# Patient Record
Sex: MALE | Race: WHITE | NOT HISPANIC OR LATINO | Employment: OTHER | URBAN - METROPOLITAN AREA
[De-identification: names, ages, dates, MRNs, and addresses within clinical notes are randomized per-mention and may not be internally consistent; named-entity substitution may affect disease eponyms.]

---

## 2019-04-10 ENCOUNTER — HISTORICAL (OUTPATIENT)
Dept: ADMINISTRATIVE | Facility: HOSPITAL | Age: 49
End: 2019-04-10

## 2019-04-10 LAB
ABS NEUT (OLG): 2.7 X10(3)/MCL (ref 2.1–9.2)
ALBUMIN SERPL-MCNC: 4.2 GM/DL (ref 3.4–5)
ALBUMIN/GLOB SERPL: 1.91 {RATIO} (ref 1.5–2.5)
ALP SERPL-CCNC: 80 UNIT/L (ref 38–126)
ALT SERPL-CCNC: 37 UNIT/L (ref 7–52)
APPEARANCE, UA: CLEAR
AST SERPL-CCNC: 20 UNIT/L (ref 15–37)
BACTERIA #/AREA URNS AUTO: NORMAL /HPF
BILIRUB SERPL-MCNC: 0.8 MG/DL (ref 0.2–1)
BILIRUB UR QL STRIP: NEGATIVE MG/DL
BILIRUBIN DIRECT+TOT PNL SERPL-MCNC: 0.1 MG/DL (ref 0–0.5)
BILIRUBIN DIRECT+TOT PNL SERPL-MCNC: 0.7 MG/DL
BUN SERPL-MCNC: 16 MG/DL (ref 7–18)
CALCIUM SERPL-MCNC: 9.1 MG/DL (ref 8.5–10)
CHLORIDE SERPL-SCNC: 102 MMOL/L (ref 98–107)
CHOLEST SERPL-MCNC: 233 MG/DL (ref 0–200)
CHOLEST/HDLC SERPL: 6.7 {RATIO}
CO2 SERPL-SCNC: 32 MMOL/L (ref 21–32)
COLOR UR: NORMAL
CREAT SERPL-MCNC: 1.04 MG/DL (ref 0.6–1.3)
ERYTHROCYTE [DISTWIDTH] IN BLOOD BY AUTOMATED COUNT: 12.1 % (ref 11.5–17)
EST. AVERAGE GLUCOSE BLD GHB EST-MCNC: 108 MG/DL
GLOBULIN SER-MCNC: 2.2 GM/DL (ref 1.2–3)
GLUCOSE (UA): NEGATIVE MG/DL
GLUCOSE SERPL-MCNC: 90 MG/DL (ref 74–106)
H PYLORI AB SER IA-ACNC: NEGATIVE
HBA1C MFR BLD: 5.4 % (ref 4.4–6.4)
HCT VFR BLD AUTO: 43.2 % (ref 42–52)
HDLC SERPL-MCNC: 35 MG/DL (ref 35–60)
HGB BLD-MCNC: 15.2 GM/DL (ref 14–18)
HGB UR QL STRIP: NEGATIVE UNIT/L
KETONES UR QL STRIP: NEGATIVE MG/DL
LDLC SERPL CALC-MCNC: 104 MG/DL (ref 0–129)
LEUKOCYTE ESTERASE UR QL STRIP: NEGATIVE UNIT/L
LYMPHOCYTES # BLD AUTO: 1.8 X10(3)/MCL (ref 0.6–3.4)
LYMPHOCYTES NFR BLD AUTO: 36.1 % (ref 13–40)
MCH RBC QN AUTO: 31.5 PG (ref 27–31.2)
MCHC RBC AUTO-ENTMCNC: 35 GM/DL (ref 32–36)
MCV RBC AUTO: 90 FL (ref 80–94)
MONOCYTES # BLD AUTO: 0.4 X10(3)/MCL (ref 0.1–1.3)
MONOCYTES NFR BLD AUTO: 9.1 % (ref 0.1–24)
NEUTROPHILS NFR BLD AUTO: 54.8 % (ref 47–80)
NITRITE UR QL STRIP.AUTO: NEGATIVE
PH UR STRIP: 7 [PH]
PLATELET # BLD AUTO: 223 X10(3)/MCL (ref 130–400)
PMV BLD AUTO: 8.4 FL (ref 9.4–12.4)
POTASSIUM SERPL-SCNC: 4.1 MMOL/L (ref 3.5–5.1)
PROT SERPL-MCNC: 6.4 GM/DL (ref 6.4–8.2)
PROT UR QL STRIP: NEGATIVE MG/DL
PSA SERPL-MCNC: 0 NG/ML (ref 0–3.5)
RBC # BLD AUTO: 4.82 X10(6)/MCL (ref 4.7–6.1)
RBC #/AREA URNS HPF: NORMAL /HPF
SODIUM SERPL-SCNC: 139 MMOL/L (ref 136–145)
SP GR UR STRIP: 1.02
SQUAMOUS EPITHELIAL, UA: NORMAL /LPF
TESTOST SERPL-MCNC: 410 NG/DL (ref 300–1060)
TRIGL SERPL-MCNC: 299 MG/DL (ref 30–150)
TSH SERPL-ACNC: 1.41 MIU/ML (ref 0.35–4.94)
UROBILINOGEN UR STRIP-ACNC: 0.2 MG/DL
VLDLC SERPL CALC-MCNC: 59.8 MG/DL
WBC # SPEC AUTO: 4.9 X10(3)/MCL (ref 4.5–11.5)
WBC #/AREA URNS AUTO: NORMAL /[HPF]

## 2019-09-26 ENCOUNTER — HISTORICAL (OUTPATIENT)
Dept: ADMINISTRATIVE | Facility: HOSPITAL | Age: 49
End: 2019-09-26

## 2019-09-26 LAB
ABS NEUT (OLG): 3.5 X10(3)/MCL (ref 2.1–9.2)
ALBUMIN SERPL-MCNC: 4.4 GM/DL (ref 3.4–5)
ALBUMIN/GLOB SERPL: 2.2 {RATIO} (ref 1.5–2.5)
ALP SERPL-CCNC: 91 UNIT/L (ref 38–126)
ALT SERPL-CCNC: 62 UNIT/L (ref 7–52)
APPEARANCE, UA: CLEAR
AST SERPL-CCNC: 40 UNIT/L (ref 15–37)
BACTERIA #/AREA URNS AUTO: NORMAL /HPF
BILIRUB SERPL-MCNC: 0.6 MG/DL (ref 0.2–1)
BILIRUB UR QL STRIP: NEGATIVE MG/DL
BILIRUBIN DIRECT+TOT PNL SERPL-MCNC: 0.1 MG/DL (ref 0–0.5)
BILIRUBIN DIRECT+TOT PNL SERPL-MCNC: 0.5 MG/DL
BUN SERPL-MCNC: 13 MG/DL (ref 7–18)
CALCIUM SERPL-MCNC: 9.1 MG/DL (ref 8.5–10)
CHLORIDE SERPL-SCNC: 99 MMOL/L (ref 98–107)
CHOLEST SERPL-MCNC: 361 MG/DL (ref 0–200)
CHOLEST/HDLC SERPL: 8.4 {RATIO}
CO2 SERPL-SCNC: 33 MMOL/L (ref 21–32)
COLOR UR: YELLOW
CREAT SERPL-MCNC: 1.07 MG/DL (ref 0.6–1.3)
ERYTHROCYTE [DISTWIDTH] IN BLOOD BY AUTOMATED COUNT: 12.3 % (ref 11.5–17)
GLOBULIN SER-MCNC: 2 GM/DL (ref 1.2–3)
GLUCOSE (UA): NEGATIVE MG/DL
GLUCOSE SERPL-MCNC: 108 MG/DL (ref 74–106)
HCT VFR BLD AUTO: 42.9 % (ref 42–52)
HDLC SERPL-MCNC: 43 MG/DL (ref 35–60)
HGB BLD-MCNC: 15 GM/DL (ref 14–18)
HGB UR QL STRIP: NEGATIVE UNIT/L
KETONES UR QL STRIP: NEGATIVE MG/DL
LDLC SERPL CALC-MCNC: 161 MG/DL (ref 0–129)
LEUKOCYTE ESTERASE UR QL STRIP: NEGATIVE UNIT/L
LYMPHOCYTES # BLD AUTO: 1.8 X10(3)/MCL (ref 0.6–3.4)
LYMPHOCYTES NFR BLD AUTO: 30.9 % (ref 13–40)
MCH RBC QN AUTO: 31 PG (ref 27–31.2)
MCHC RBC AUTO-ENTMCNC: 35 GM/DL (ref 32–36)
MCV RBC AUTO: 89 FL (ref 80–94)
MONOCYTES # BLD AUTO: 0.5 X10(3)/MCL (ref 0.1–1.3)
MONOCYTES NFR BLD AUTO: 8.1 % (ref 0.1–24)
NEUTROPHILS NFR BLD AUTO: 61 % (ref 47–80)
NITRITE UR QL STRIP.AUTO: NEGATIVE
PH UR STRIP: 5.5 [PH]
PLATELET # BLD AUTO: 257 X10(3)/MCL (ref 130–400)
PMV BLD AUTO: 7.8 FL (ref 9.4–12.4)
POTASSIUM SERPL-SCNC: 4.1 MMOL/L (ref 3.5–5.1)
PROT SERPL-MCNC: 6.4 GM/DL (ref 6.4–8.2)
PROT UR QL STRIP: NEGATIVE MG/DL
RBC # BLD AUTO: 4.84 X10(6)/MCL (ref 4.7–6.1)
RBC #/AREA URNS HPF: NORMAL /HPF
SODIUM SERPL-SCNC: 138 MMOL/L (ref 136–145)
SP GR UR STRIP: 1.02
SQUAMOUS EPITHELIAL, UA: NORMAL /LPF
TRIGL SERPL-MCNC: 432 MG/DL (ref 30–150)
UROBILINOGEN UR STRIP-ACNC: 0.2 MG/DL
VLDLC SERPL CALC-MCNC: 86.4 MG/DL
WBC # SPEC AUTO: 5.8 X10(3)/MCL (ref 4.5–11.5)
WBC #/AREA URNS AUTO: NORMAL /[HPF]

## 2019-09-27 LAB
EST. AVERAGE GLUCOSE BLD GHB EST-MCNC: 111 MG/DL
HBA1C MFR BLD: 5.5 % (ref 4.4–6.4)

## 2020-09-28 ENCOUNTER — HISTORICAL (OUTPATIENT)
Dept: ADMINISTRATIVE | Facility: HOSPITAL | Age: 50
End: 2020-09-28

## 2020-09-28 LAB
ABS NEUT (OLG): 4.9 X10(3)/MCL (ref 2.1–9.2)
ALBUMIN SERPL-MCNC: 4.4 GM/DL (ref 3.4–5)
ALBUMIN/GLOB SERPL: 1.91 {RATIO} (ref 1.5–2.5)
ALP SERPL-CCNC: 90 UNIT/L (ref 38–126)
ALT SERPL-CCNC: 19 UNIT/L (ref 7–52)
AST SERPL-CCNC: 16 UNIT/L (ref 15–37)
BILIRUB SERPL-MCNC: 0.6 MG/DL (ref 0.2–1)
BILIRUBIN DIRECT+TOT PNL SERPL-MCNC: 0.1 MG/DL (ref 0–0.5)
BILIRUBIN DIRECT+TOT PNL SERPL-MCNC: 0.5 MG/DL
BUN SERPL-MCNC: 14 MG/DL (ref 7–18)
CALCIUM SERPL-MCNC: 9.7 MG/DL (ref 8.5–10.1)
CHLORIDE SERPL-SCNC: 103 MMOL/L (ref 98–107)
CHOLEST SERPL-MCNC: 181 MG/DL (ref 0–200)
CHOLEST/HDLC SERPL: 5.2 {RATIO}
CO2 SERPL-SCNC: 30 MMOL/L (ref 21–32)
CREAT SERPL-MCNC: 1.06 MG/DL (ref 0.6–1.3)
ERYTHROCYTE [DISTWIDTH] IN BLOOD BY AUTOMATED COUNT: 13.3 % (ref 11.5–17)
GLOBULIN SER-MCNC: 2.3 GM/DL (ref 1.2–3)
GLUCOSE SERPL-MCNC: 100 MG/DL (ref 74–106)
HCT VFR BLD AUTO: 44.7 % (ref 42–52)
HDLC SERPL-MCNC: 35 MG/DL (ref 35–60)
HGB BLD-MCNC: 15 GM/DL (ref 14–18)
LDLC SERPL CALC-MCNC: 98 MG/DL (ref 0–129)
LYMPHOCYTES # BLD AUTO: 1.5 X10(3)/MCL (ref 0.6–3.4)
LYMPHOCYTES NFR BLD AUTO: 21.2 % (ref 13–40)
MCH RBC QN AUTO: 30.7 PG (ref 27–31.2)
MCHC RBC AUTO-ENTMCNC: 34 GM/DL (ref 32–36)
MCV RBC AUTO: 91 FL (ref 80–94)
MONOCYTES # BLD AUTO: 0.5 X10(3)/MCL (ref 0.1–1.3)
MONOCYTES NFR BLD AUTO: 6.9 % (ref 0.1–24)
NEUTROPHILS NFR BLD AUTO: 71.9 % (ref 47–80)
PLATELET # BLD AUTO: 265 X10(3)/MCL (ref 130–400)
PMV BLD AUTO: 8.2 FL (ref 9.4–12.4)
POTASSIUM SERPL-SCNC: 4 MMOL/L (ref 3.5–5.1)
PROT SERPL-MCNC: 6.7 GM/DL (ref 6.4–8.2)
RBC # BLD AUTO: 4.89 X10(6)/MCL (ref 4.7–6.1)
SODIUM SERPL-SCNC: 142 MMOL/L (ref 136–145)
TRIGL SERPL-MCNC: 211 MG/DL (ref 30–150)
VLDLC SERPL CALC-MCNC: 42.2 MG/DL
WBC # SPEC AUTO: 6.9 X10(3)/MCL (ref 4.5–11.5)

## 2021-07-20 ENCOUNTER — HISTORICAL (OUTPATIENT)
Dept: ADMINISTRATIVE | Facility: HOSPITAL | Age: 51
End: 2021-07-20

## 2021-09-01 ENCOUNTER — HISTORICAL (OUTPATIENT)
Dept: ADMINISTRATIVE | Facility: HOSPITAL | Age: 51
End: 2021-09-01

## 2021-10-07 ENCOUNTER — HISTORICAL (OUTPATIENT)
Dept: ADMINISTRATIVE | Facility: HOSPITAL | Age: 51
End: 2021-10-07

## 2021-10-07 LAB
ABS NEUT (OLG): 4.7 X10(3)/MCL (ref 2.1–9.2)
ALBUMIN SERPL-MCNC: 4.5 GM/DL (ref 3.4–5)
ALBUMIN/GLOB SERPL: 1.88 {RATIO} (ref 1.5–2.5)
ALP SERPL-CCNC: 131 UNIT/L (ref 38–126)
ALT SERPL-CCNC: 48 UNIT/L (ref 7–52)
APPEARANCE, UA: CLEAR
APTT PPP: 36.3 SECOND(S) (ref 23.2–33.7)
AST SERPL-CCNC: 35 UNIT/L (ref 15–37)
BACTERIA #/AREA URNS AUTO: ABNORMAL /HPF
BILIRUB SERPL-MCNC: 0.4 MG/DL (ref 0.2–1)
BILIRUB UR QL STRIP: NEGATIVE MG/DL
BILIRUBIN DIRECT+TOT PNL SERPL-MCNC: 0.1 MG/DL (ref 0–0.5)
BILIRUBIN DIRECT+TOT PNL SERPL-MCNC: 0.3 MG/DL
BUN SERPL-MCNC: 16 MG/DL (ref 7–18)
CALCIUM SERPL-MCNC: 9.7 MG/DL (ref 8.5–10.1)
CHLORIDE SERPL-SCNC: 101 MMOL/L (ref 98–107)
CHOLEST SERPL-MCNC: 250 MG/DL (ref 0–200)
CHOLEST/HDLC SERPL: 8.3 {RATIO}
CO2 SERPL-SCNC: 32 MMOL/L (ref 21–32)
COLOR UR: YELLOW
CREAT SERPL-MCNC: 0.79 MG/DL (ref 0.6–1.3)
ERYTHROCYTE [DISTWIDTH] IN BLOOD BY AUTOMATED COUNT: 13.1 % (ref 11.5–17)
EST. AVERAGE GLUCOSE BLD GHB EST-MCNC: 111 MG/DL
GLOBULIN SER-MCNC: 2.4 GM/DL (ref 1.2–3)
GLUCOSE (UA): NEGATIVE MG/DL
GLUCOSE SERPL-MCNC: 82 MG/DL (ref 74–106)
HBA1C MFR BLD: 5.5 % (ref 4.4–6.4)
HCT VFR BLD AUTO: 45.9 % (ref 42–52)
HDLC SERPL-MCNC: 30 MG/DL (ref 35–60)
HGB BLD-MCNC: 15.2 GM/DL (ref 14–18)
HGB UR QL STRIP: NEGATIVE UNIT/L
INR PPP: 0.9 (ref 0–1.3)
KETONES UR QL STRIP: NEGATIVE MG/DL
LDLC SERPL CALC-MCNC: 101 MG/DL (ref 0–129)
LEUKOCYTE ESTERASE UR QL STRIP: NEGATIVE UNIT/L
LYMPHOCYTES # BLD AUTO: 1.3 X10(3)/MCL (ref 0.6–3.4)
LYMPHOCYTES NFR BLD AUTO: 19.6 % (ref 13–40)
MCH RBC QN AUTO: 30.9 PG (ref 27–31.2)
MCHC RBC AUTO-ENTMCNC: 33 GM/DL (ref 32–36)
MCV RBC AUTO: 93 FL (ref 80–94)
MONOCYTES # BLD AUTO: 0.6 X10(3)/MCL (ref 0.1–1.3)
MONOCYTES NFR BLD AUTO: 8.6 % (ref 0.1–24)
NEUTROPHILS NFR BLD AUTO: 71.8 % (ref 47–80)
NITRITE UR QL STRIP.AUTO: NEGATIVE
PH UR STRIP: 6 [PH]
PLATELET # BLD AUTO: 249 X10(3)/MCL (ref 130–400)
PMV BLD AUTO: 8.5 FL (ref 9.4–12.4)
POTASSIUM SERPL-SCNC: 4.2 MMOL/L (ref 3.5–5.1)
PROT SERPL-MCNC: 6.9 GM/DL (ref 6.4–8.2)
PROT UR QL STRIP: NEGATIVE MG/DL
PROTHROMBIN TIME: 11.8 SECOND(S) (ref 12.5–14.5)
RBC # BLD AUTO: 4.92 X10(6)/MCL (ref 4.7–6.1)
RBC #/AREA URNS HPF: ABNORMAL /HPF
SODIUM SERPL-SCNC: 140 MMOL/L (ref 136–145)
SP GR UR STRIP: >1.03
SQUAMOUS EPITHELIAL, UA: ABNORMAL /LPF
TRIGL SERPL-MCNC: 499 MG/DL (ref 30–150)
UROBILINOGEN UR STRIP-ACNC: 0.2 MG/DL
VLDLC SERPL CALC-MCNC: 99.8 MG/DL
WBC # SPEC AUTO: 6.6 X10(3)/MCL (ref 4.5–11.5)
WBC #/AREA URNS AUTO: ABNORMAL /[HPF]

## 2021-10-08 ENCOUNTER — HISTORICAL (OUTPATIENT)
Dept: ADMINISTRATIVE | Facility: HOSPITAL | Age: 51
End: 2021-10-08

## 2021-10-08 ENCOUNTER — HISTORICAL (OUTPATIENT)
Dept: LAB | Facility: HOSPITAL | Age: 51
End: 2021-10-08

## 2021-10-08 LAB — MRSA SCREEN BY PCR: NEGATIVE

## 2021-10-22 ENCOUNTER — HISTORICAL (OUTPATIENT)
Dept: ADMINISTRATIVE | Facility: HOSPITAL | Age: 51
End: 2021-10-22

## 2021-10-25 ENCOUNTER — HISTORICAL (OUTPATIENT)
Dept: LAB | Facility: HOSPITAL | Age: 51
End: 2021-10-25

## 2021-10-25 LAB — SARS-COV-2 AG RESP QL IA.RAPID: NEGATIVE

## 2021-11-24 ENCOUNTER — HISTORICAL (OUTPATIENT)
Dept: ADMINISTRATIVE | Facility: HOSPITAL | Age: 51
End: 2021-11-24

## 2021-12-06 ENCOUNTER — HISTORICAL (OUTPATIENT)
Dept: ADMINISTRATIVE | Facility: HOSPITAL | Age: 51
End: 2021-12-06

## 2021-12-06 LAB
ABS NEUT (OLG): 5.47 X10(3)/MCL (ref 2.1–9.2)
ALBUMIN SERPL-MCNC: 3.9 GM/DL (ref 3.5–5)
ALBUMIN/GLOB SERPL: 1.3 RATIO (ref 1.1–2)
ALP SERPL-CCNC: 146 UNIT/L (ref 40–150)
ALT SERPL-CCNC: 43 UNIT/L (ref 0–55)
AST SERPL-CCNC: 22 UNIT/L (ref 5–34)
BASOPHILS # BLD AUTO: 0.06 X10(3)/MCL (ref 0–0.2)
BASOPHILS NFR BLD AUTO: 0.8 % (ref 0–0.9)
BILIRUB SERPL-MCNC: 0.4 MG/DL (ref 0.2–1.2)
BILIRUBIN DIRECT+TOT PNL SERPL-MCNC: 0.1 MG/DL (ref 0–0.5)
BILIRUBIN DIRECT+TOT PNL SERPL-MCNC: 0.3 MG/DL (ref 0–0.8)
BUN SERPL-MCNC: 11.3 MG/DL (ref 8.4–25.7)
CALCIUM SERPL-MCNC: 9.8 MG/DL (ref 8.4–10.2)
CHLORIDE SERPL-SCNC: 103 MMOL/L (ref 98–107)
CO2 SERPL-SCNC: 32 MMOL/L (ref 22–29)
CREAT SERPL-MCNC: 0.83 MG/DL (ref 0.72–1.25)
EOSINOPHIL # BLD AUTO: 0.29 X10(3)/MCL (ref 0–0.9)
EOSINOPHIL NFR BLD AUTO: 3.7 % (ref 0–6.5)
ERYTHROCYTE [DISTWIDTH] IN BLOOD BY AUTOMATED COUNT: 13.6 % (ref 11.5–17)
GLOBULIN SER-MCNC: 3.1 GM/DL (ref 2.4–3.5)
GLUCOSE SERPL-MCNC: 70 MG/DL (ref 74–100)
HCT VFR BLD AUTO: 43.3 % (ref 42–52)
HGB BLD-MCNC: 14.4 GM/DL (ref 14–18)
IMM GRANULOCYTES # BLD AUTO: 0.04 10*3/UL (ref 0–0.02)
IMM GRANULOCYTES NFR BLD AUTO: 0.5 % (ref 0–0.43)
LYMPHOCYTES # BLD AUTO: 1.53 X10(3)/MCL (ref 0.6–4.6)
LYMPHOCYTES NFR BLD AUTO: 19.4 % (ref 16.2–38.3)
MCH RBC QN AUTO: 31.4 PG (ref 27–31)
MCHC RBC AUTO-ENTMCNC: 33.3 GM/DL (ref 33–36)
MCV RBC AUTO: 94.5 FL (ref 80–94)
MONOCYTES # BLD AUTO: 0.51 X10(3)/MCL (ref 0.1–1.3)
MONOCYTES NFR BLD AUTO: 6.5 % (ref 4.7–11.3)
MRSA SCREEN BY PCR: NEGATIVE
NEUTROPHILS # BLD AUTO: 5.47 X10(3)/MCL (ref 2.1–9.2)
NEUTROPHILS NFR BLD AUTO: 69.1 % (ref 49.1–73.4)
NRBC BLD AUTO-RTO: 0 % (ref 0–0.2)
PLATELET # BLD AUTO: 280 X10(3)/MCL (ref 130–400)
PMV BLD AUTO: 8.7 FL (ref 7.4–10.4)
POTASSIUM SERPL-SCNC: 3.9 MMOL/L (ref 3.5–5.1)
PROT SERPL-MCNC: 7 GM/DL (ref 6.4–8.3)
RBC # BLD AUTO: 4.58 X10(6)/MCL (ref 4.7–6.1)
SODIUM SERPL-SCNC: 142 MMOL/L (ref 136–145)
WBC # SPEC AUTO: 7.9 X10(3)/MCL (ref 4.5–11.5)

## 2021-12-15 ENCOUNTER — HISTORICAL (OUTPATIENT)
Dept: LAB | Facility: HOSPITAL | Age: 51
End: 2021-12-15

## 2021-12-15 LAB — SARS-COV-2 AG RESP QL IA.RAPID: NEGATIVE

## 2022-01-12 ENCOUNTER — HISTORICAL (OUTPATIENT)
Dept: ADMINISTRATIVE | Facility: HOSPITAL | Age: 52
End: 2022-01-12

## 2022-01-19 ENCOUNTER — HISTORICAL (OUTPATIENT)
Dept: ADMINISTRATIVE | Facility: HOSPITAL | Age: 52
End: 2022-01-19

## 2022-01-19 LAB
APPEARANCE, UA: CLEAR
BACTERIA #/AREA URNS AUTO: ABNORMAL /HPF
BILIRUB UR QL STRIP: NEGATIVE MG/DL
COLOR UR: YELLOW
GLUCOSE (UA): NEGATIVE MG/DL
HGB UR QL STRIP: NEGATIVE UNIT/L
KETONES UR QL STRIP: ABNORMAL MG/DL
LEUKOCYTE ESTERASE UR QL STRIP: NEGATIVE UNIT/L
NITRITE UR QL STRIP.AUTO: NEGATIVE
PH UR STRIP: 5 [PH]
PROT UR QL STRIP: NEGATIVE MG/DL
RBC #/AREA URNS HPF: ABNORMAL /HPF
SP GR UR STRIP: >1.03
SQUAMOUS EPITHELIAL, UA: ABNORMAL /LPF
UROBILINOGEN UR STRIP-ACNC: 0.2 MG/DL
WBC #/AREA URNS AUTO: ABNORMAL /[HPF]

## 2022-01-21 LAB — FINAL CULTURE: NO GROWTH

## 2022-04-11 ENCOUNTER — HISTORICAL (OUTPATIENT)
Dept: ADMINISTRATIVE | Facility: HOSPITAL | Age: 52
End: 2022-04-11

## 2022-04-24 VITALS
DIASTOLIC BLOOD PRESSURE: 68 MMHG | WEIGHT: 181 LBS | SYSTOLIC BLOOD PRESSURE: 120 MMHG | HEIGHT: 70 IN | OXYGEN SATURATION: 99 % | BODY MASS INDEX: 25.91 KG/M2

## 2022-05-04 NOTE — HISTORICAL [facility]
This is a historical note converted from [place]. Formatting and pictures may have been removed.  Please reference [place] for original formatting and attached multimedia. Chief Complaint  Pre op left hip 10/26/21  History of Present Illness  51-year-old male presents office today for preoperative?exam for robotic assisted left total hip arthroplasty on October 26  No new complaints or concerns today  The patient?has?avascular necrosis of both femoral heads with advanced femoral head collapse.? He has pain?with weightbearing, range of motion?and activity.? This has affected his quality life and decrease his activities of daily living.  Review of Systems  Systemic: No fever, no chills, and no recent weight change.  Head: No headache - frequent.  Eyes: No vision problems.  Otolarnygeal: No hearing loss, no earache, no epistaxis, no hoarseness, and no tooth pain. Gums normal.  Cardiovascular: No chest pain or discomfort and no palpitations.  Pulmonary: No pulmonary symptoms - no dyspnea, no shortness of breath  Gastrointestinal: Appetite not decreased. No dysphagia and no constant eructation. No nausea, no vomiting, no abdominal pain, no hematochezia.  Genitourinary: No genitourinary symptoms - No urinary hesitancy. No urinary loss of control - no burning sensation during urination.  Musculoskeletal: No calf muscle cramps and no localized soft tissue swelling  Neurological: No fainting and no convulsions.  Psychological: no depression.  Skin: No rash.  Physical Exam  Vitals & Measurements  T:?97.0? ?F (Oral)? HR:?90(Peripheral)? BP:?152/91? WT:?79.400?kg? BMI:?25.34?  General exam:  Well-groomed, well-nourished, no acute distress  Alert and oriented ?3  HEENT: PERRLA, normocephalic, atraumatic  Cardiovascular: S1 and S2 heard, no murmurs  Pulmonary: Lungs clear to auscultation bilaterally  Gastrointestinal: Positive bowel sounds, soft, nontender  ?   Left hip exam  No signs of edema, erythema, or induration. No muscle  atrophy seen. Tenderness to palpation over the greater trochanteric region. No tenderness over the sacroiliac joint.  Pain was elicited by active internal rotation with the hip extended. Pain was elicited by active external rotation with the hip extended. Pain was elicited by active internal rotation with the hip flexed. Pain was elicited by active external rotation with the hip flexed. Pain was elicited by active motion. Pain was elicited by passive motion. Hip was not dislocated or subluxed. No instability or laxity seen.  Passive hip abduction?20 degrees  Passive hip flexion 80 degrees  Passive internal rotation 0 degrees  Passive external rotation 5 degrees  Neurological:  Motor (Motor Strength): No weakness of the left hip was observed.  Gait And Stance: Abnormal. An antalgic gait was observed. limping was noted on the left.  ?   Tests  Imaging:  Left hip x-ray with two or more views of the AP and lateral aspects were reviewed  Impressions  Advanced?femoral head collapse?  ?   2 views of the lumbar spine were taken in the office today for?presurgical planning and determination of sacral slope. ?No acute fractures  Assessment/Plan  1.?Avascular necrosis of left femoral head?M87.052  ?Robotic assisted left total hip arthroplasty  We will use Ecotrin aspirin postoperatively for DVT prophylaxis  The risks, benefits, and alternatives to surgery were discussed with the patient and family and they wish to proceed with the operation.  ?  Ordered:  CBC w/ Auto Diff, Routine collect, 10/07/21 13:31:00 CDT, Blood, Order for future visit, Stop date 10/07/21 13:31:00 CDT, Lab Collect, ORTHO-If Hg<11, refer to hematology, Avascular necrosis of left femoral head  Impaired gait and mobility  Pre-op exam, 10/07/21 13:3...  Clinic Follow-up PRN, 10/08/21 9:22:00 CDT, Future Order, [facility]  Comprehensive Metabolic Panel, Routine collect, 10/07/21 13:31:00 CDT, Blood, Order for future visit, Stop date 10/07/21 13:31:00  CDT, Lab Collect, ORTHO-If albumin <3.5, refer to internal medicine, Avascular necrosis of left femoral head  Impaired gait and mobility  Pre-op exam,...  Hemoglobin A1c, Routine collect, 10/07/21 13:31:00 CDT, Blood, Order for future visit, Stop date 10/07/21 13:31:00 CDT, Lab Collect, Avascular necrosis of left femoral head  Impaired gait and mobility  Pre-op exam, 10/07/21 13:31:00 CDT  MRSA Pcr, Routine collect, Nasopharyngeal Swab, Order for future visit, 10/07/21 13:31:00 CDT, Stop date 10/07/21 13:31:00 CDT, Nurse collect, Avascular necrosis of left femoral head  Impaired gait and mobility  Pre-op exam, 10/07/21 13:31:00 CDT  Office/Outpatient Visit Level 3 Established [id number] PC, Avascular necrosis of left femoral head  Impaired gait and mobility  Pre[facility] exam, [facility], 10/08/21 9:22:00 CDT  XR Chest 2 Views, Routine, 10/07/21 13:31:00 CDT, Other (please specify), None, Ambulatory, Rad Type, Order for future visit, Avascular necrosis of left femoral head  Impaired gait and mobility  Pre-op exam, Not Scheduled, 10/07/21 13:31:00 CDT  ?  2.?Impaired gait and mobility?R26.89  Ordered:  CBC w/ Auto Diff, Routine collect, 10/07/21 13:31:00 CDT, Blood, Order for future visit, Stop date 10/07/21 13:31:00 CDT, Lab Collect, ORTHO-If Hg<11, refer to hematology, Avascular necrosis of left femoral head  Impaired gait and mobility  Pre-op exam, 10/07/21 13:3...  Clinic Follow-up PRN, 10/08/21 9:22:00 CDT, Future Order, [facility]  Comprehensive Metabolic Panel, Routine collect, 10/07/21 13:31:00 CDT, Blood, Order for future visit, Stop date 10/07/21 13:31:00 CDT, Lab Collect, ORTHO-If albumin <3.5, refer to internal medicine, Avascular necrosis of left femoral head  Impaired gait and mobility  Pre-op exam,...  Hemoglobin A1c, Routine collect, 10/07/21 13:31:00 CDT, Blood, Order for future visit, Stop date 10/07/21 13:31:00 CDT, Lab Collect, Avascular necrosis of left femoral head  Impaired  gait and mobility  Pre-op exam, 10/07/21 13:31:00 CDT  MRSA Pcr, Routine collect, Nasopharyngeal Swab, Order for future visit, 10/07/21 13:31:00 CDT, Stop date 10/07/21 13:31:00 CDT, Nurse collect, Avascular necrosis of left femoral head  Impaired gait and mobility  Pre-op exam, 10/07/21 13:31:00 CDT  Office/Outpatient Visit Level 3 Established [id number] PC, Avascular necrosis of left femoral head  Impaired gait and mobility  [facility] exam, [facility], 10/08/21 9:22:00 CDT  XR Chest 2 Views, Routine, 10/07/21 13:31:00 CDT, Other (please specify), None, Ambulatory, Rad Type, Order for future visit, Avascular necrosis of left femoral head  Impaired gait and mobility  Pre-op exam, Not Scheduled, 10/07/21 13:31:00 CDT  ?  3.?Pre-op exam?Z01.818  Ordered:  CBC w/ Auto Diff, Routine collect, 10/07/21 13:31:00 CDT, Blood, Order for future visit, Stop date 10/07/21 13:31:00 CDT, Lab Collect, ORTHO-If Hg<11, refer to hematology, Avascular necrosis of left femoral head  Impaired gait and mobility  Pre-op exam, 10/07/21 13:3...  Clinic Follow-up PRN, 10/08/21 9:22:00 CDT, Future Order, [facility]  Comprehensive Metabolic Panel, Routine collect, 10/07/21 13:31:00 CDT, Blood, Order for future visit, Stop date 10/07/21 13:31:00 CDT, Lab Collect, ORTHO-If albumin <3.5, refer to internal medicine, Avascular necrosis of left femoral head  Impaired gait and mobility  Pre-op exam,...  Hemoglobin A1c, Routine collect, 10/07/21 13:31:00 CDT, Blood, Order for future visit, Stop date 10/07/21 13:31:00 CDT, Lab Collect, Avascular necrosis of left femoral head  Impaired gait and mobility  Pre-op exam, 10/07/21 13:31:00 CDT  MRSA Pcr, Routine collect, Nasopharyngeal Swab, Order for future visit, 10/07/21 13:31:00 CDT, Stop date 10/07/21 13:31:00 CDT, Nurse collect, Avascular necrosis of left femoral head  Impaired gait and mobility  Pre-op exam, 10/07/21 13:31:00 CDT  Office/Outpatient Visit Level 3 Established [id number]  PC, Avascular necrosis of left femoral head  Impaired gait and mobility  Pre-op exam, [facility], 10/08/21 9:22:00 CDT  XR Chest 2 Views, Routine, 10/07/21 13:31:00 CDT, Other (please specify), None, Ambulatory, Rad Type, Order for future visit, Avascular necrosis of left femoral head  Impaired gait and mobility  Pre-op exam, Not Scheduled, 10/07/21 13:31:00 CDT  ?   Problem List/Past Medical History  Ongoing  Anxiety  Avascular necrosis of left femoral head  Avascular necrosis of right femoral head  Chronic back pain  Fatigue  Gastroesophageal reflux disease  Hx of migraine headaches  Hyperlipidaemia  Impaired gait and mobility  Influenza vaccine refused  Pre-op exam  Wellness examination  Historical  No qualifying data  Procedure/Surgical History  Esophagogastroduodenoscopy (07/05/2017)  Esophagogastroduodenoscopy (12/09/2016)  Colonoscopy with Polypectomy (07/30/2013)  cardiac ablation x 2  knee repair x2  L5 S1  prostate ca   Medications  Inpatient  No active inpatient medications  Home  acetaminophen-hydrocodone 325 mg-5 mg oral tablet, 1 tab(s), Oral, q6hr  clonazePAM 1 mg oral tablet, See Instructions, 5 refills  DULoxetine 60 mg oral delayed release capsule, 60 mg= 1 cap(s), Oral, Daily  naproxen 500 mg oral tablet, 500 mg= 1 tab(s), Oral, BID  OXYBUTYNIN 5 MG TABLET, 5 mg= 1 tab(s), Oral, qPM  pregabalin 150 mg oral capsule, 150 mg= 1 cap(s), Oral, TID  tadalafil 20 mg oral tablet, 20 mg= 1 tab(s), Oral, Daily  tiZANidine 4 mg oral tablet, 4 mg= 1 tab(s), Oral, BID  Allergies  Adhesive Bandage?(Rash)  No Known Medication Allergies  Social History  Abuse/Neglect  No, No, Yes, 10/08/2021  Alcohol  Current, Liquor, 1-2 times per month, 09/01/2021  Employment/School  Employed, Work/School description: SELF EMPLOYED SALES., 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  Diabetes mellitus type 1.: Father.  Diabetes mellitus type 2: Negative: Mother and Father.  Heart disease.....: Mother.  Immunizations  Vaccine Date Status   tetanus/diphtheria/pertussis, acel(Tdap) 12/2011 Recorded       Chest x-ray is normal.

## 2022-05-04 NOTE — HISTORICAL [facility]
This is a historical note converted from Remington. Formatting and pictures may have been removed.  Please reference Remington for original formatting and attached multimedia. Chief Complaint  Annual wellness physical- Non-Fasting  History of Present Illness  Pt presents for Wellness exam.  He has been having a lot of discomfort secondary to his avascular necrosis.  He is not sleeping well secondary to his pain.  He reports h/o hypoglycemia. There is a strong family of diabetes mellitus.  Appetite is good.  He works at his [employer] shop.  He quit smoking 2012.  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 He currently sees Dr Flores; last office visit 9/04/2020.  He may have a rare drink.  He has 1/2 cup of coffee some days; he has 1-2 Dr Peppers/day.  He is?.  Colonoscopy 2013: Dr Rosenberg, was due for follow-up in 5 years.  EGD 2017: Dr Chun.  Urologist: Dr Richard.  Pain management: Dr Shelia Vincent.  Ortho: Dr Gonzalez; bilateral avascular necrosis, will be scheduled for left hip arthroplasty 10/26/2021  Review of Systems  Constitutional:??no?weight gain,??no?weight loss,??no?fatigue, ?no?fever, ?no?chills, ?no?weakness, ?no?trouble sleeping  Eyes: ?no?vision loss/changes,??+?glasses,?readers, ?no?pain,??no?redness,??no?blurry or double vision,??no?flashing lights,??no?glaucoma,??no?cataracts  Last eye exam:? about?2 years ago  Neck: ?no?lymphadenopathy,??no?thyroid abnormalities,??no?bruits,??no?stiffness  Ears:??no?decreased hearing,??no?tinnitus,??no?earache,??no?drainage?  Nose:??no?congestion,??no?rhinorrhea,??no?epistaxis,??no?sinus pressure  Throat/Oral:??no?sore throat,??no?hoarseness, ?no?dental caries,??no?gum bleeding,??no?oral lesions  Cardiovascular:??no?chest pain, palpitations, or tightness,??no?dyspnea with exertion,??no?orthopnea,??no?paroxysmal nocturnal dyspnea, h/o WPW, status post RFA x 2, +  hypercholesteremia  Respiratory:??no?cough,??no?sputum,??no?hemoptysis,??no?dyspnea,??no?wheezing,??no?pleuritic chest pain?  Gastrointestinal:??no?abdominal pain,??no?nausea,??no?vomiting,??+?heartburn/GERD, ?no?dysphagia or odynophagia,??no?diarrhea,??no?constipation,??no?melena,??no?hematochezia,?no?jaundice  Urinary:??no?frequency,??no?urgency,??no?burning or pain,?no?hematuria,??no?incontinence,??no?hesitancy,??no?incomplete voiding,??no?flank pain,??no?nocturia, h/o prostate cancer, s/p prostatectomy  Musculoskeletal:?no?myalgias,??+?arthralgias: bilateral?avascular necrosis, ?tendonitis left elbow, ?no?neck pain,??+?back pain,?chronic, has stimulator, ?no?swelling of extremities  Skin:?no?rashes,??no?sores,??no?non-healing wounds  Neurologic:??+?headaches, h/o migraines, no?dizziness/lightheadedness,??no?tremors,??no?paresthesias,??no?seizures,??no?muscle weakness  Psychiatric:??no?depression/sadness,??no?anhedonia,??no?irritability,??no?suicidal ideations,??+?anxiety,??no?panic attacks  Endocrine:??no?hot or cold intolerance,??no?sweating,??no?polyuria,??no?polydipsia,??no?polyphagia  Hematologic:??no?bruising,??no?bleeding disorders?  Physical Exam  Vitals & Measurements  T:?36.8? ?C (Oral)? HR:?93(Peripheral)? BP:?150/70? SpO2:?98%?  HT:?177.00?cm? WT:?79.400?kg? BMI:?25.34?  ?  GENERAL: The patient is a well-developed, well-nourished white male in no?apparent distress. He is alert and oriented x 4.  HEENT: Head is normocephalic and atraumatic. Extraocular muscles are intact. Pupils are equal, round, and reactive to light and accommodation. Nares appeared normal. Mouth is well hydrated and without lesions. Mucous membranes are moist. Posterior pharynx clear of any exudate or lesions.  NECK: Supple. No carotid bruits.? No lymphadenopathy or thyromegaly.  LUNGS: Clear to auscultation.  HEART: Regular rate and rhythm without murmur, gallops or rubs.  ABDOMEN: Soft, nontender, and nondistended.? Positive bowel  sounds.? No hepatosplenomegaly was noted.  EXTREMITIES: Without any cyanosis, clubbing, rash, lesions or edema.  NEUROLOGIC: Cranial nerves II through XII are grossly intact.? No motor or sensory deficits.? Cerebellar function intact.  SKIN: No ulceration or induration present.  :? deferred.  ?  Assessment/Plan  1.?Wellness examination?Z00.00  Patient presents for wellness examination.  Patient was recently discovered to have bilateral vascular necrosis;?he will have a left total hip?arthroplasty on 10/26/2021 for Dr. Gonzalez.  He has changed pain management doctors; he is now seeing Dr. Shelia Vincent.  Patient is overdue for a follow-up colonoscopy; however,?he will not be able to do this till?his hip situation is resolved.  Patient declines a flu vaccine this time.  Patient will need a Shingrix vaccine at some point.  His EKG today reveals normal sinus rhythm and is within normal limits. ?He has not seen Dr. Flores in a year.  Chest x-ray is pending.  Labs pending.  Ordered:  Clinic Follow-Up Wellness, *Est. 10/07/22 3:00:00 CDT, Order for future visit, Wellness examination, HLink AFP  Comprehensive Metabolic Panel, Routine collect, 10/07/21 11:01:00 CDT, Blood, Stop date 10/07/21 11:01:00 CDT, Lab Collect, Wellness examination  Pre-op evaluation  Hyperlipidaemia, 10/07/21 11:01:00 CDT  Lipid Panel, Routine collect, 10/07/21 11:01:00 CDT, Blood, Stop date 10/07/21 11:01:00 CDT, Lab Collect, Wellness examination  Hyperlipidaemia, 10/07/21 11:01:00 CDT  Preventative Health Care Est 40-64 years [id number] PC, Wellness examination  Pre-op evaluation  Hyperlipidaemia  Gastroesophageal reflux disease  Hx of migraine headaches  Anxiety  Chronic back pain  Avascular necrosis of left femoral head  Avascular necrosis of right femoral head, HLINK AMB - AFP...  ?  2.?Pre-op evaluation?Z01.818  His EKG today reveals normal sinus rhythm is within normal limits.  Chest x-ray pending.  Labs pending.  Ordered:  Comprehensive  Metabolic Panel, Routine collect, 10/07/21 11:01:00 CDT, Blood, Stop date 10/07/21 11:01:00 CDT, Lab Collect, Wellness examination  Pre-op evaluation  Hyperlipidaemia, 10/07/21 11:01:00 CDT  Preventative Health Care Est 40-64 years [id number] PC, Wellness examination  Pre-op evaluation  Hyperlipidaemia  Gastroesophageal reflux disease  Hx of migraine headaches  Anxiety  Chronic back pain  Avascular necrosis of left femoral head  Avascular necrosis of right femoral head, HLINK AMB - AFP...  PT, Routine collect, 10/08/21 5:00:00 CDT, Blood, Order for future visit, Stop date 10/08/21 5:00:00 CDT, Lab Collect, Pre-op evaluation, 10/08/21 5:00:00 CDT  PTT, Routine collect, 10/07/21 10:29:00 CDT, Blood, Order for future visit, Stop date 10/07/21 10:29:00 CDT, Lab Collect, Pre-op evaluation, 10/07/21 10:29:00 CDT  XR Chest 2 Views, Routine, 10/07/21 10:29:00 CDT, Other (please specify), None, Ambulatory, Rad Type, Pre-op evaluation, Not Scheduled, [facility], 10/07/21 10:29:00 CDT  ?  3.?Hyperlipidaemia?E78.5  Patient advised to follow a low-cholesterol diet.  Lipid profile pending.  Ordered:  Clinic Follow up, *Est. 04/07/22 9:00:00 CDT, Order for future visit, Gastroesophageal reflux disease, HLink AFP  Comprehensive Metabolic Panel, Routine collect, 10/07/21 11:01:00 CDT, Blood, Stop date 10/07/21 11:01:00 CDT, Lab Collect, Wellness examination  Pre-op evaluation  Hyperlipidaemia, 10/07/21 11:01:00 CDT  Lipid Panel, Routine collect, 10/07/21 11:01:00 CDT, Blood, Stop date 10/07/21 11:01:00 CDT, Lab Collect, Wellness examination  Hyperlipidaemia, 10/07/21 11:01:00 CDT  Preventative Health Care Est 40-64 years [id number] PC, Wellness examination  Pre-op evaluation  Hyperlipidaemia  Gastroesophageal reflux disease  Hx of migraine headaches  Anxiety  Chronic back pain  Avascular necrosis of left femoral head  Avascular necrosis of right femoral head, HLINK AMB - AFP...  ?  4.?Gastroesophageal  reflux disease?K21.9  ?Well-controlled on medication.  Ordered:  Clinic Follow up, *Est. 04/07/22 9:00:00 CDT, Order for future visit, Gastroesophageal reflux disease, HLink AFP  Preventative Health Care Est 40-64 years [id number] PC, Wellness examination  Pre-op evaluation  Hyperlipidaemia  Gastroesophageal reflux disease  Hx of migraine headaches  Anxiety  Chronic back pain  Avascular necrosis of left femoral head  Avascular necrosis of right femoral head, HLINK AMB - AFP...  ?  5.?Hx of migraine headaches?Z86.69  ?Patient states his headaches have not been an issue.  Ordered:  Preventative Health Care Est 40-64 years [id number] PC, Wellness examination  Pre-op evaluation  Hyperlipidaemia  Gastroesophageal reflux disease  Hx of migraine headaches  Anxiety  Chronic back pain  Avascular necrosis of left femoral head  Avascular necrosis of right femoral head, HLINK AMB - AFP...  ?  6.?Anxiety?F41.9  ?Stable; continue current medication.? He has only?been taking the clonazepam in the evening.? Refills sent.  Ordered:  Clinic Follow up, *Est. 04/07/22 9:00:00 CDT, Order for future visit, Gastroesophageal reflux disease, HLink AFP  Preventative Health Care Est 40-64 years [id number] PC, Wellness examination  Pre-op evaluation  Hyperlipidaemia  Gastroesophageal reflux disease  Hx of migraine headaches  Anxiety  Chronic back pain  Avascular necrosis of left femoral head  Avascular necrosis of right femoral head, HLINK AMB - AFP...  ?  7.?Chronic back pain?M54.9  ?Now seeing Dr. Shelia Vincent.  Ordered:  Preventative Health Care Est 40-64 years [id number] PC, Wellness examination  Pre-op evaluation  Hyperlipidaemia  Gastroesophageal reflux disease  Hx of migraine headaches  Anxiety  Chronic back pain  Avascular necrosis of left femoral head  Avascular necrosis of right femoral head, HLINK AMB - AFP...  ?  8.?Avascular necrosis of left femoral head?M87.052  Patient with upcoming  arthroplasty?on?10/26/2021.  Ordered:  Preventative Health Care Est 40-64 years [id number] PC, Wellness examination  Pre-op evaluation  Hyperlipidaemia  Gastroesophageal reflux disease  Hx of migraine headaches  Anxiety  Chronic back pain  Avascular necrosis of left femoral head  Avascular necrosis of right femoral head, HLINK AMB - AFP...  ?  9.?Avascular necrosis of right femoral head?M87.051  Patient will need surgery on this side as well.  Ordered:  Preventative Health Care Est 40-64 years [id number] PC, Wellness examination  Pre-op evaluation  Hyperlipidaemia  Gastroesophageal reflux disease  Hx of migraine headaches  Anxiety  Chronic back pain  Avascular necrosis of left femoral head  Avascular necrosis of right femoral head, HLINK AMB - AFP...  ?  10.?Influenza vaccine refused?Z28.21  Patient declines flu shot at this time; benefits/potential risks/side effects discussed with patient.  ?  11.?Immunization due?Z23  ?Patient will need a tetanus shot when available.  Patient will need a Shingrix vaccine at some point.  ?  Orders:  clonazePAM, See Instructions, TAKE 1 TABLET BY MOUTH IN EVENING., # 30 tab(s), 5 Refill(s), Pharmacy: [id number], 177, cm, Height/Length Dosing, 10/07/21 10:00:00 CDT, 79.4, kg, Weight Dosing, 10/07/21 10:00:00 CDT  Referrals  Clinic Follow up, *Est. 04/07/22 9:00:00 CDT, Order for future visit, Gastroesophageal reflux disease, ink AFP  Clinic Follow-Up Wellness, *Est. 10/07/22 3:00:00 CDT, Order for future visit, Wellness examination, ink AFP   Problem List/Past Medical History  Ongoing  Anxiety  Avascular necrosis of left femoral head  Avascular necrosis of right femoral head  Chronic back pain  Fatigue  Gastroesophageal reflux disease  Hx of migraine headaches  Hyperlipidaemia  Influenza vaccine refused  Wellness examination  Historical  No qualifying data  Procedure/Surgical History  Esophagogastroduodenoscopy (07/05/2017)  Esophagogastroduodenoscopy  (12/09/2016)  Colonoscopy with Polypectomy (07/30/2013)  cardiac ablation x 2  knee repair x2  L5 S1  prostate ca   Medications  acetaminophen-hydrocodone 325 mg-5 mg oral tablet, 1 tab(s), Oral, q6hr  clonazePAM 1 mg oral tablet, See Instructions, 5 refills  DULoxetine 60 mg oral delayed release capsule, 60 mg= 1 cap(s), Oral, Daily  naproxen 500 mg oral tablet, 500 mg= 1 tab(s), Oral, BID  OXYBUTYNIN 5 MG TABLET, 5 mg= 1 tab(s), Oral, qPM  pregabalin 150 mg oral capsule, 150 mg= 1 cap(s), Oral, TID  tadalafil 20 mg oral tablet, 20 mg= 1 tab(s), Oral, Daily  tiZANidine 4 mg oral tablet, 4 mg= 1 tab(s), Oral, BID  Allergies  Adhesive Bandage?(Rash)  No Known Medication Allergies  Social History  Abuse/Neglect  No, No, Yes, 09/01/2021  Alcohol  Current, Liquor, 1-2 times per month, 09/01/2021  Employment/School  Employed, Work/School description: SELF EMPLOYED SALES., 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  Diabetes mellitus type 1.: Father.  Diabetes mellitus type 2: Negative: Mother and Father.  Heart disease.....: Mother.  Immunizations  Vaccine Date Status   tetanus/diphtheria/pertussis, acel(Tdap) 12/2011 Recorded   Health Maintenance  Health Maintenance  ???Pending?(in the next year)  ??? ??OverDue  ??? ? ? ?Alcohol Misuse Screening due??01/02/21??and every 1??year(s)  ??? ??Due?  ??? ? ? ?ADL Screening due??10/07/21??and every 1??year(s)  ??? ? ? ?Aspirin Therapy for CVD Prevention due??10/07/21??and every 1??year(s)  ??? ? ? ?Zoster Vaccine due??10/07/21??Unknown Frequency  ??? ??Due In Future?  ??? ? ? ?Tetanus Vaccine not due until??12/01/21??and every 10??year(s)  ??? ? ? ?Obesity Screening not due until??01/01/22??and every 1??year(s)  ??? ? ? ?Depression Screening not due  until??09/01/22??and every 1??year(s)  ???Satisfied?(in the past 1 year)  ??? ??Satisfied?  ??? ? ? ?Blood Pressure Screening on??10/07/21.??Satisfied by Yesenia Richards LPN  ??? ? ? ?Body Mass Index Check on??10/07/21.??Satisfied by Yesenia Richards LPN  ??? ? ? ?Depression Screening on??09/01/21.??Satisfied by Jenni Roberts  ??? ? ? ?Diabetes Screening on??10/07/21.??Satisfied by Grady Cruz  ??? ? ? ?Hypertension Management-Blood Pressure on??10/07/21.??Satisfied by Yesenia Richards LPN  ??? ? ? ?Influenza Vaccine on??10/07/21.??Satisfied by Yesenia Richards LPN  ??? ? ? ?Obesity Screening on??10/07/21.??Satisfied by Yesenia Richards LPN  ?  Diagnostic Results  EKG: Normal sinus rhythm, within normal limits.

## 2022-05-04 NOTE — HISTORICAL [facility]
This is a historical note converted from Remington. Formatting and pictures may have been removed.  Please reference Remington for original formatting and attached multimedia. Chief Complaint  L total hip, global 10/26/21  History of Present Illness  51-year-old male presents office today for follow-up on his left hip. ?He is 1 month status post left total hip arthroplasty secondary to avascular process. ?He is doing very well.? No complaints of left hip pain today. ?He is ambulate with crutches.? He is very happy with his progress thus far. ?He is ready to discuss?robotic assisted?right total hip arthroplasty as he has?avascular necrosis with femoral head collapse  Review of Systems  Systemic: No fever, no chills, and no recent weight change.  Head: No headache - frequent.  Eyes: No vision problems.  Otolarnygeal: No hearing loss, no earache, no epistaxis, no hoarseness, and no tooth pain. Gums normal.  Cardiovascular: No chest pain or discomfort and no palpitations.  Pulmonary: No pulmonary symptoms - no dyspnea, no shortness of breath  Gastrointestinal: Appetite not decreased. No dysphagia and no constant eructation. No nausea, no vomiting, no abdominal pain, no hematochezia.  Genitourinary: No genitourinary symptoms - No urinary hesitancy. No urinary loss of control - no burning sensation during urination.  Musculoskeletal: No calf muscle cramps and no localized soft tissue swelling  Neurological: No fainting and no convulsions.  Psychological: no depression.  Skin: No rash.  Physical Exam  General exam:  Well-groomed, well-nourished, no acute distress  Alert and oriented ?3  HEENT: PERRLA, normocephalic, atraumatic  Cardiovascular: S1 and S2 heard, no murmurs  Pulmonary: Lungs clear to auscultation bilaterally  Gastrointestinal: Positive bowel sounds, soft, nontender  ?  Musculoskeletal System:  Left?hips:  General/bilateral: ? No swelling of the hips. ? No induration of the hips. ? No tenderness on palpation of the  hips. ? No instability of the hips was noted.  Left?hip: ? Motion was normal.  Neurological:  Motor (Strength): ? Weakness of the left hip was observed.  Skin:  ? No cellulitis.  Wound healing normal. Surgical incision C/D/I  Tests  Imaging:  X-Ray Of The Pelvis:  Pelvic x-ray was performed.  X-Ray Hip:  Complete left hip x-ray with two or more views of the AP and lateral aspects were performed.  Impressions Radiology Test  X-ray of hip was performed showed intact left hip prosthesis.  ?  Right hip exam  No signs of edema, erythema, or induration. No muscle atrophy seen. Tenderness to palpation over the greater trochanteric region. No tenderness over the sacroiliac joint.  Pain was elicited by active internal rotation with the hip extended. Pain was elicited by active external rotation with the hip extended. Pain was elicited by active internal rotation with the hip flexed. Pain was elicited by active external rotation with the hip flexed. Pain was elicited by active motion. Pain was elicited by passive motion. Hip was not dislocated or subluxed. No instability or laxity seen.  Passive hip abduction?20 degrees  Passive hip flexion 80 degrees  Passive internal rotation 0 degrees  Passive external rotation 0 degrees  Neurological:  Motor (Motor Strength): No weakness of the left hip was observed.  Gait And Stance: Abnormal. An antalgic gait was observed.  ?   Tests  Imaging:  Right hip x-ray with two or more views of the AP and lateral aspects were?reviewed.  Impressions  Advanced femoral head collapse.  Assessment/Plan  1.?Status post left hip replacement?Z96.642  ?Continue with?physical therapy and crutches. ?He will follow-up in 3 months for his?follow-up visit.  Ordered:  Clinic Follow up, *Est. 02/24/22 3:00:00 CST, Order for future visit, Status post left hip replacement  Avascular necrosis of right femoral head, [facility]  Post-Op follow-up visit [id number] PC, Status post left hip replacement  Avascular  necrosis of right femoral head, [facility]opa[facility]ics Clinic, 11/24/21 15:03:00 CST  ?  2.?Avascular necrosis of right femoral head?M87.051  ?Robotic assisted right total hip arthroplasty on December 16  We will use Ecotrin aspirin postoperatively for DVT prophylaxis  The risks, benefits, and alternatives to surgery were discussed with the patient and family and they wish to proceed with the operation.  Ordered:  CBC w/ Auto Diff, Routine collect, 11/24/21 15:04:00 CST, Blood, Order for future visit, Stop date 11/24/21 15:04:00 CST, Lab Collect, ORTHO-If Hg<11, refer to hematology, Avascular necrosis of right femoral head, 11/24/21 15:04:00 CST  Clinic Follow up, *Est. 02/24/22 3:00:00 CST, Order for future visit, Status post left hip replacement  Avascular necrosis of right femoral head, [facility]  Comprehensive Metabolic Panel, Routine collect, 11/24/21 15:04:00 CST, Blood, Order for future visit, Stop date 11/24/21 15:04:00 CST, Lab Collect, ORTHO-If albumin <3.5, refer to internal medicine, Avascular necrosis of right femoral head, 11/24/21 15:04:00 CST  MRSA Pcr, Routine collect, Nasopharyngeal Swab, Order for future visit, 11/24/21 15:04:00 CST, Stop date 11/24/21 15:04:00 CST, Nurse collect, Avascular necrosis of right femoral head, 11/24/21 15:04:00 CST  Post-Op follow-up visit [id number] PC, Status post left hip replacement  Avascular necrosis of right femoral head, [facility], 11/24/21 15:03:00 CST  ?   Problem List/Past Medical History  Ongoing  Anxiety  Avascular necrosis of left femoral head  Avascular necrosis of right femoral head  Chronic back pain  Fatigue  Gastroesophageal reflux disease  Hx of migraine headaches  Hyperlipidaemia  Impaired gait and mobility  Influenza vaccine refused  Pre-op exam  Status post left hip replacement  Wellness examination  Historical  No qualifying data  Procedure/Surgical History  BIENVENIDO MCGOVERN Total Hip Arthroplasty (Left) (10/26/2021)  Replacement of Left Hip Joint  with Synthetic Substitute, Uncemented, Open Approach (10/26/2021)  Robotic Assisted Procedure of Lower Extremity, Open Approach (10/26/2021)  Esophagogastroduodenoscopy (07/05/2017)  Esophagogastroduodenoscopy (12/09/2016)  Colonoscopy with Polypectomy (07/30/2013)  cardiac ablation x 2  knee repair x2  L5 S1  prostate ca   Medications  Inpatient  No active inpatient medications  Home  aspirin 81 mg oral Delayed Release (EC) tablet, 81 mg= 1 tab(s), Oral, BID  clonazePAM 1 mg oral tablet, See Instructions, 5 refills  DULoxetine 60 mg oral delayed release capsule, 60 mg= 1 cap(s), Oral, Daily  OXYBUTYNIN 5 MG TABLET, 5 mg= 1 tab(s), Oral, qPM  pregabalin 150 mg oral capsule, 150 mg= 1 cap(s), Oral, TID  tadalafil 20 mg oral tablet, 20 mg= 1 tab(s), Oral, Daily  tiZANidine 4 mg oral tablet, 4 mg= 1 tab(s), Oral, BID  Allergies  Adhesive Bandage?(Rash)  No Known Medication Allergies  Social History  Abuse/Neglect  No, No, Yes, 11/24/2021  Alcohol  Current, Liquor, 1-2 times per month, 09/01/2021  Employment/School  Employed, Work/School description: SELF EMPLOYED SALES., 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  Diabetes mellitus type 1.: Father.  Diabetes mellitus type 2: Negative: Mother and Father.  Heart disease.....: Mother.  Immunizations  Vaccine Date Status   tetanus/diphtheria/pertussis, acel(Tdap) 12/2011 Recorded

## 2022-05-04 NOTE — HISTORICAL [facility] CERNER
This is a historical note converted from [place]. Formatting and pictures may have been removed.  Please reference [place] for original formatting and attached multimedia. Chief Complaint  New Patient presents for bilateral hip pain. Left worse than right. Denies prior surgery or injury. States having a constant throbbing/aching pain in both hips that radiates into both his knees. Reports difficulty walking, having to use cane at times.  History of Present Illness  50-year-old male presents office today for evaluation of his bilateral hip pain, left greater than right.? This is been bothering from many years but has worsened over the last couple of months.? His pain is localized in the groin and radiates down to the knees. ?He does have a history of?spinal cord stimulator for chronic pain.? He recently underwent?MRIs of both hips?for continued pain and was diagnosed with avascular necrosis of both hips?and sent to?orthopedics for?consultation.? Currently he ambulates without an assistive device but has constant pain with weightbearing, range of motion and activity.? This is severely limited his activities of daily living  Review of Systems  Systemic: No fever, no chills, and no recent weight change.  Head: No headache - frequent.  Eyes: No vision problems.  Otolarnygeal: No hearing loss, no earache, no epistaxis, no hoarseness, and no tooth pain. Gums normal.  Cardiovascular: No chest pain or discomfort and no palpitations.  Pulmonary: No pulmonary symptoms - no dyspnea, no shortness of breath  Gastrointestinal: Appetite not decreased. No dysphagia and no constant eructation. No nausea, no vomiting, no abdominal pain, no hematochezia.  Genitourinary: No genitourinary symptoms - No urinary hesitancy. No urinary loss of control - no burning sensation during urination.  Musculoskeletal: No calf muscle cramps and no localized soft tissue swelling  Neurological: No fainting and no convulsions.  Psychological: no  depression.  Skin: No rash.  Physical Exam  Vitals & Measurements  T:?97.6? ?F (Oral)? HR:?102(Peripheral)? BP:?134/77?  HT:?177.00?cm? WT:?78.190?kg? BMI:?24.96?  Left hip exam  No signs of edema, erythema, or induration. No muscle atrophy seen. Tenderness to palpation over the greater trochanteric region. No tenderness over the sacroiliac joint.  Pain was elicited by active internal rotation with the hip extended. Pain was elicited by active external rotation with the hip extended. Pain was elicited by active internal rotation with the hip flexed. Pain was elicited by active external rotation with the hip flexed. Pain was elicited by active motion. Pain was elicited by passive motion. Hip was not dislocated or subluxed. No instability or laxity seen.  Passive hip abduction?20 degrees  Passive hip flexion 80 degrees  Passive internal rotation 0 degrees  Passive external rotation 5 degrees  Neurological:  Motor (Motor Strength): No weakness of the left hip was observed.  Gait And Stance: Abnormal. An antalgic gait was observed. limping was noted on the left.  ?  Tests  Imaging:  Left hip x-ray with two or more views of the AP and lateral aspects were performed.  Impressions  Advanced?femoral head collapse?  ?  ?  Right hip exam  No signs of edema, erythema, or induration. No muscle atrophy seen. Tenderness to palpation over the greater trochanteric region. No tenderness over the sacroiliac joint.  Pain was elicited by active internal rotation with the hip extended. Pain was elicited by active external rotation with the hip extended. Pain was elicited by active internal rotation with the hip flexed. Pain was elicited by active external rotation with the hip flexed. Pain was elicited by active motion. Pain was elicited by passive motion. Hip was not dislocated or subluxed. No instability or laxity seen.  Passive hip abduction?20 degrees  Passive hip flexion 80 degrees  Passive internal rotation 0 degrees  Passive  external rotation 0 degrees  Neurological:  Motor (Motor Strength): No weakness of the left hip was observed.  Gait And Stance: Abnormal. An antalgic gait was observed.  ?  Tests  Imaging:  Right hip x-ray with two or more views of the AP and lateral aspects were performed.  Impressions  Advanced femoral head collapse.  Assessment/Plan  1.?Avascular necrosis of left femoral head?M87.052  ?Robotic assisted left total hip arthroplasty  The risks, benefits, and alternatives to surgery were discussed with the patient and family and they wish to proceed with the operation.  Ordered:  Clinic Follow-up PRN, 09/01/21 13:40:00 CDT, Future Order, [facility]  ?  2.?Avascular necrosis of right femoral head?M87.051  Ordered:  Clinic Follow-up PRN, 09/01/21 13:40:00 CDT, Future Order, [facility]  ?  Referrals  Clinic Follow-up PRN, 09/01/21 13:40:00 CDT, Future Order, [facility]   Problem List/Past Medical History  Ongoing  Anxiety  Avascular necrosis of left femoral head  Avascular necrosis of right femoral head  Chronic back pain  Fatigue  Gastroesophageal reflux disease  Hx of migraine headaches  Hyperlipidaemia  Influenza vaccine refused  Wellness examination  Historical  No qualifying data  Procedure/Surgical History  Esophagogastroduodenoscopy (07/05/2017)  Esophagogastroduodenoscopy (12/09/2016)  Colonoscopy with Polypectomy (07/30/2013)  cardiac ablation x 2  knee repair x2  L5 S1  prostate ca   Medications  acetaminophen-hydrocodone 325 mg-5 mg oral tablet, 1 tab(s), Oral, q6hr,? ?Not taking  clonazePAM 1 mg oral tablet, See Instructions, 5 refills,? ?Still taking, not as prescribed: Once a day at night  DULoxetine 20 mg oral delayed release capsule, 20 mg= 1 cap(s), Oral, BID,? ?Not taking  DULoxetine 60 mg oral delayed release capsule, 60 mg= 1 cap(s), Oral, Daily  naproxen 500 mg oral tablet, 500 mg= 1 tab(s), Oral, BID  OXYBUTYNIN 5 MG TABLET, 5 mg= 1 tab(s), Oral, qPM  pregabalin 150 mg oral capsule, 150  mg= 1 cap(s), Oral, TID  pregabalin 50 mg oral capsule, 50 mg= 1 cap(s), Oral, BID,? ?Not taking  SUMAtriptan 100 mg oral tablet, 100 mg= 1 tab(s), Oral, Daily, PRN, 1 refills  tadalafil 20 mg oral tablet, 20 mg= 1 tab(s), Oral, Daily  tiZANidine 4 mg oral tablet, 4 mg= 1 tab(s), Oral, BID,? 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  Diabetes mellitus type 1.: Father.  Diabetes mellitus type 2: Negative: Mother and Father.  Heart disease.....: Mother.  Immunizations  Vaccine Date Status   tetanus/diphtheria/pertussis, acel(Tdap) 12/2011 Recorded   Health Maintenance  Health Maintenance  ???Pending?(in the next year)  ??? ??OverDue  ??? ? ? ?Alcohol Misuse Screening due??01/02/21??and every 1??year(s)  ??? ??Due?  ??? ? ? ?ADL Screening due??09/01/21??and every 1??year(s)  ??? ? ? ?Aspirin Therapy for CVD Prevention due??09/01/21??and every 1??year(s)  ??? ? ? ?Zoster Vaccine due??09/01/21??Unknown Frequency  ??? ??Due In Future?  ??? ? ? ?Tetanus Vaccine not due until??12/01/21??and every 10??year(s)  ??? ? ? ?Obesity Screening not due until??01/01/22??and every 1??year(s)  ???Satisfied?(in the past 1 year)  ??? ??Satisfied?  ??? ? ? ?Blood Pressure Screening on??09/01/21.??Satisfied by Jenni Roberts  ??? ? ? ?Body Mass Index Check on??09/01/21.??Satisfied by Jenni Roberts  ??? ? ? ?Depression Screening on??09/01/21.??Satisfied by Jenni Roberts  ??? ? ? ?Diabetes Screening on??09/28/20.??Satisfied by Grady Cruz  ??? ? ?  ?Hypertension Management-Blood Pressure on??09/01/21.??Satisfied by Jenni Roberts  ??? ? ? ?Influenza Vaccine on??02/19/21.??Satisfied by Yesenia Richards LPN  ??? ? ? ?Lipid Screening on??09/28/20.??Satisfied by Grady Cruz  ??? ? ? ?Obesity Screening on??09/01/21.??Satisfied by Jenni Roberts  ?

## 2022-05-04 NOTE — HISTORICAL [facility] CERN[facility]
This is a historical note converted from [place]. Formatting and pictures may have been removed.  Please reference [place] for original formatting and attached multimedia. Chief Complaint  4 WEEK POST OP RIGHT ANTONIO 12/16/21. REPORTS SORENESS. STATES HAVING PAIN IN THE LEFT HIP. XRAY TODAY.  History of Present Illness  Zak comes in today for a 1 month follow-up on his right hip. ?He is 1 month status post right total hip arthroplasty. ?Overall he is doing well with minimal pain. ?He does report some soreness.? He is?ambulating with a walker and attending physical therapy  Review of Systems  Systemic: No fever, no chills, and no recent weight change.  Head: No headache - frequent.  Eyes: No vision problems.  Otolarnygeal: No hearing loss, no earache, no epistaxis, no hoarseness, and no tooth pain. Gums normal.  Cardiovascular: No chest pain or discomfort and no palpitations.  Pulmonary: No pulmonary symptoms - no dyspnea, no shortness of breath  Gastrointestinal: Appetite not decreased. No dysphagia and no constant eructation. No nausea, no vomiting, no abdominal pain, no hematochezia.  Genitourinary: No genitourinary symptoms - No urinary hesitancy. No urinary loss of control - no burning sensation during urination.  Musculoskeletal: No calf muscle cramps and no localized soft tissue swelling  Neurological: No fainting and no convulsions.  Psychological: no depression.  Skin: No rash.  Physical Exam  Vitals & Measurements  HT:?177.00?cm? WT:?81.600?kg? BMI:?26.05?  Musculoskeletal System:  right Hips:  General/bilateral: ? No swelling of the hips. ? No induration of the hips. ? No tenderness on palpation of the hips. ? No instability of the hips was noted.  right?Hip: ? Motion was normal.  Neurological:  Motor (Strength): ? Weakness of the?right hip was observed.  Skin:  ? No cellulitis.  Wound healing normal. Surgical incision C/D/I  Tests  Imaging:  X-Ray Of The Pelvis:  Pelvic x-ray was performed.  X-Ray  Hip:  Complete?right hip x-ray with two or more views of the AP and lateral aspects were performed.  Impressions Radiology Test  X-ray of hip was performed showed intact?right hip prosthesis.  Assessment/Plan  1.?Aftercare following joint replacement surgery?Z47.1  ?He will continue?weightbearing with a walker until he feels comfortable and then transition to a cane. ?Continue with physical therapy and follow-up in 3 months for repeat evaluation  Ordered:  Clinic Follow up, *Est. 04/12/22 3:00:00 CDT, Order for future visit, Aftercare following joint replacement surgery  Status post right hip replacement, [facility]  Post-Op follow-up visit [id number] PC, Aftercare following joint replacement surgery  Status post right hip replacement, [facility], 01/12/22 15:50:00 CST  ?  2.?Status post right hip replacement?Z96.641  Ordered:  Clinic Follow up, *Est. 04/12/22 3:00:00 CDT, Order for future visit, Aftercare following joint replacement surgery  Status post right hip replacement, [facility]  Post-Op follow-up visit [id number] PC, Aftercare following joint replacement surgery  Status post right hip replacement, [facility]opa[facility]ics Clinic, 01/12/22 15:50:00 CST  ?  Referrals  PT/OT Ambulatory Referral, Specialty: Physical Therapy, Start: 01/12/22 15:50:00 CST, 4, Anit Grav Hip Abductor & Extensor Exc.  Aquatics for ROM & Strength  Isotonic hamstring & Closed Chain Quad  No Dry Needling  Therapeutic Excercise  Stretch and Strengthen, Instruction...  Clinic Follow up, *Est. 04/12/22 3:00:00 CDT, Order for future visit, Aftercare following joint replacement surgery  Status post right hip replacement, [facility]   Problem List/Past Medical History  Ongoing  Anxiety  Avascular necrosis of left femoral head  Avascular necrosis of right femoral head  Chronic back pain  Fatigue  Gastroesophageal reflux disease  Hx of migraine headaches  Hyperlipidaemia  Impaired gait and mobility  Influenza vaccine  refused  Pre-op exam  Status post left hip replacement  Status post right hip replacement  Wellness examination  Historical  No qualifying data  Procedure/Surgical History  BIENVENIDO MCGOVERN Total Hip Arthroplasty (Right) (12/16/2021)  Replacement of Right Hip Joint with Synthetic Substitute, Uncemented, Open Approach (12/16/2021)  Robotic Assisted Procedure of Lower Extremity, Open Approach (12/16/2021)  BIENVENIDO MCGOVERN Total Hip Arthroplasty (Left) (10/26/2021)  Replacement of Left Hip Joint with Synthetic Substitute, Uncemented, Open Approach (10/26/2021)  Robotic Assisted Procedure of Lower Extremity, Open Approach (10/26/2021)  Esophagogastroduodenoscopy (07/05/2017)  Esophagogastroduodenoscopy (12/09/2016)  Colonoscopy with Polypectomy (07/30/2013)  cardiac ablation x 2  knee repair x2  L5 S1  prostate ca   Medications  aspirin 81 mg oral Delayed Release (EC) tablet, 81 mg= 1 tab(s), Oral, BID  clonazePAM 1 mg oral tablet, See Instructions, 5 refills  DULoxetine 60 mg oral delayed release capsule, 60 mg= 1 cap(s), Oral, Daily  OXYBUTYNIN 5 MG TABLET, 5 mg= 1 tab(s), Oral, qPM  polyethylene glycol 3350 oral powder for reconstitution, 17 gm, Oral, Daily  pregabalin 150 mg oral capsule, 150 mg= 1 cap(s), Oral, TID  tadalafil 20 mg oral tablet, 20 mg= 1 tab(s), Oral, Daily  tiZANidine 4 mg oral tablet, 4 mg= 1 tab(s), Oral, At Bedtime  Allergies  Adhesive Bandage?(Rash)  No Known Medication Allergies  Social History  Abuse/Neglect  No, No, Yes, 01/12/2022  Alcohol  Current, Liquor, 1-2 times per month, 09/01/2021  Employment/School  Employed, Work/School description: SELF EMPLOYED SALES., 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  Diabetes mellitus type 1.: Father.  Diabetes mellitus type 2:  Negative: Mother and Father.  Heart disease.....: Mother.  Immunizations  Vaccine Date Status   tetanus/diphtheria/pertussis, acel(Tdap) 12/2011 Recorded   Health Maintenance  Health Maintenance  ???Pending?(in the next year)  ??? ??OverDue  ??? ? ? ?Influenza Vaccine due??10/01/21??and every 1??day(s)  ??? ? ? ?Smoking Cessation due??01/01/22??Variable frequency  ??? ??Due?  ??? ? ? ?Tetanus Vaccine due??12/01/21??and every 10??year(s)  ??? ? ? ?Alcohol Misuse Screening due??01/02/22??and every 1??year(s)  ??? ? ? ?ADL Screening due??01/12/22??and every 1??year(s)  ??? ? ? ?Zoster Vaccine due??01/12/22??Unknown Frequency  ??? ??Due In Future?  ??? ? ? ?Depression Screening not due until??09/01/22??and every 1??year(s)  ??? ? ? ?Blood Pressure Screening not due until??10/08/22??and every 1??year(s)  ??? ? ? ?Body Mass Index Check not due until??10/18/22??and every 1??year(s)  ??? ? ? ?Aspirin Therapy for CVD Prevention not due until??12/17/22??and every 1??year(s)  ??? ? ? ?Obesity Screening not due until??01/01/23??and every 1??year(s)  ???Satisfied?(in the past 1 year)  ??? ??Satisfied?  ??? ? ? ?Aspirin Therapy for CVD Prevention on??12/17/21.??Satisfied by MIRIAM Evangelista Rashaad  ??? ? ? ?Blood Pressure Screening on??12/17/21.??Satisfied by Cody Higgins  ??? ? ? ?Body Mass Index Check on??01/12/22.??Satisfied by Winnie Temple  ??? ? ? ?Depression Screening on??09/01/21.??Satisfied by Jenni Roberts  ??? ? ? ?Diabetes Screening on??12/17/21.??Satisfied by Emily Faustin  ??? ? ? ?Hypertension Management-Blood Pressure on??12/17/21.??Satisfied by Cody Higgins  ??? ? ? ?Influenza Vaccine on??12/16/21.??Satisfied by Tay PINEDA, Janice ABARCA  ??? ? ? ?Lipid Screening on??10/07/21.??Satisfied by Grady Cruz  ??? ? ? ?Obesity Screening on??01/12/22.??Satisfied by Winnie Temple  ?

## 2022-07-11 ENCOUNTER — OFFICE VISIT (OUTPATIENT)
Dept: ORTHOPEDICS | Facility: CLINIC | Age: 52
End: 2022-07-11
Payer: COMMERCIAL

## 2022-07-11 VITALS
BODY MASS INDEX: 23.74 KG/M2 | DIASTOLIC BLOOD PRESSURE: 84 MMHG | HEIGHT: 70 IN | SYSTOLIC BLOOD PRESSURE: 125 MMHG | HEART RATE: 67 BPM | TEMPERATURE: 98 F | WEIGHT: 165.81 LBS

## 2022-07-11 DIAGNOSIS — M17.12 OSTEOARTHRITIS OF LEFT PATELLOFEMORAL JOINT: Primary | ICD-10-CM

## 2022-07-11 PROCEDURE — 3008F BODY MASS INDEX DOCD: CPT | Mod: CPTII,,, | Performed by: PHYSICIAN ASSISTANT

## 2022-07-11 PROCEDURE — 3079F DIAST BP 80-89 MM HG: CPT | Mod: CPTII,,, | Performed by: PHYSICIAN ASSISTANT

## 2022-07-11 PROCEDURE — 3074F PR MOST RECENT SYSTOLIC BLOOD PRESSURE < 130 MM HG: ICD-10-PCS | Mod: CPTII,,, | Performed by: PHYSICIAN ASSISTANT

## 2022-07-11 PROCEDURE — 3008F PR BODY MASS INDEX (BMI) DOCUMENTED: ICD-10-PCS | Mod: CPTII,,, | Performed by: PHYSICIAN ASSISTANT

## 2022-07-11 PROCEDURE — 1160F PR REVIEW ALL MEDS BY PRESCRIBER/CLIN PHARMACIST DOCUMENTED: ICD-10-PCS | Mod: CPTII,,, | Performed by: PHYSICIAN ASSISTANT

## 2022-07-11 PROCEDURE — 1160F RVW MEDS BY RX/DR IN RCRD: CPT | Mod: CPTII,,, | Performed by: PHYSICIAN ASSISTANT

## 2022-07-11 PROCEDURE — 99213 PR OFFICE/OUTPT VISIT, EST, LEVL III, 20-29 MIN: ICD-10-PCS | Mod: ,,, | Performed by: PHYSICIAN ASSISTANT

## 2022-07-11 PROCEDURE — 1159F MED LIST DOCD IN RCRD: CPT | Mod: CPTII,,, | Performed by: PHYSICIAN ASSISTANT

## 2022-07-11 PROCEDURE — 3074F SYST BP LT 130 MM HG: CPT | Mod: CPTII,,, | Performed by: PHYSICIAN ASSISTANT

## 2022-07-11 PROCEDURE — 99213 OFFICE O/P EST LOW 20 MIN: CPT | Mod: ,,, | Performed by: PHYSICIAN ASSISTANT

## 2022-07-11 PROCEDURE — 1159F PR MEDICATION LIST DOCUMENTED IN MEDICAL RECORD: ICD-10-PCS | Mod: CPTII,,, | Performed by: PHYSICIAN ASSISTANT

## 2022-07-11 PROCEDURE — 3079F PR MOST RECENT DIASTOLIC BLOOD PRESSURE 80-89 MM HG: ICD-10-PCS | Mod: CPTII,,, | Performed by: PHYSICIAN ASSISTANT

## 2022-07-11 RX ORDER — NITROGLYCERIN 0.4 MG/1
0.4 TABLET SUBLINGUAL
COMMUNITY
Start: 2022-04-11

## 2022-07-11 RX ORDER — OXYBUTYNIN CHLORIDE 5 MG/1
5 TABLET ORAL NIGHTLY
COMMUNITY
Start: 2022-06-24

## 2022-07-11 RX ORDER — METHYLPREDNISOLONE 4 MG/1
TABLET ORAL
Qty: 21 EACH | Refills: 0 | Status: SHIPPED | OUTPATIENT
Start: 2022-07-11 | End: 2022-07-28

## 2022-07-11 RX ORDER — OMEPRAZOLE 40 MG/1
40 CAPSULE, DELAYED RELEASE ORAL DAILY
COMMUNITY
Start: 2022-06-27

## 2022-07-11 RX ORDER — TADALAFIL 20 MG/1
TABLET ORAL
COMMUNITY
Start: 2022-05-11 | End: 2022-07-28 | Stop reason: SDUPTHER

## 2022-07-11 RX ORDER — CLONAZEPAM 1 MG/1
TABLET ORAL
COMMUNITY
Start: 2022-04-07 | End: 2022-10-17 | Stop reason: SDUPTHER

## 2022-07-11 NOTE — PROGRESS NOTES
History of present illness:    51-year-old male presents office today for evaluation is left knee pain.  He has a known history of some patellofemoral osteoarthritis that he received a cortisone injection 3 months ago with physical therapy.  He notes little to no relief with the injection but still continues to work with physical therapy.  His pain is anteriorly over the knee and worse with bending.  He Does note some pain with weight-bearing as well.  He denies any locking, catching or giving way episodes  Past Medical History:   Diagnosis Date    Anxiety     Avascular necrosis of left femur     Avascular necrosis of right femur     Chondromalacia left knee     Chronic back pain     Fatigue     GERD (gastroesophageal reflux disease)     History of migraine     History of prostate cancer     HLD (hyperlipidemia)        Past Surgical History:   Procedure Laterality Date    ABLATION OF TISSUE OF CARDIAC SEPTUM USING ALCOHOL FOR HYPERTROPHIC OBSTRUCTIVE CARDIOMYOPATHY      COLONOSCOPY W/ POLYPECTOMY  07/30/2013    ESOPHAGOGASTRODUODENOSCOPY  07/05/2017    ESOPHAGOGASTRODUODENOSCOPY  12/09/2016    LAMINECTOMY      L5 S1    nica hip replacement Right 12/16/2021    NICA hip replacment  Left 10/26/2021    PROSTATE SURGERY      REPAIR OF MENISCUS OF KNEE         Current Outpatient Medications   Medication Sig    clonazePAM (KLONOPIN) 1 MG tablet   See Instructions, TAKE 1 TABLET BY MOUTH IN EVENING., # 30 tab(s), 5 Refill(s), Pharmacy: [facility], 177, cm, Height/Length Dosing, 04/07/22 9:02:00 CDT, 81.9, kg, Weight Dosing, 04/07/22 9:02:00 CDT    nitroGLYCERIN (NITROSTAT) 0.4 MG SL tablet Place 0.4 mg under the tongue.    omeprazole (PRILOSEC) 40 MG capsule Take 40 mg by mouth once daily.    oxybutynin (DITROPAN) 5 MG Tab Take 5 mg by mouth nightly.    tadalafiL (CIALIS) 20 MG Tab TAKE ONE TABLET 1 TO 2 hours prior TO intercourse AS NEEDED    zolpidem (AMBIEN CR) 12.5 MG CR tablet  "Take 1 tablet (12.5 mg total) by mouth nightly as needed for Insomnia.    methylPREDNISolone (MEDROL DOSEPACK) 4 mg tablet use as directed    tiZANidine (ZANAFLEX) 4 MG tablet Take 1 tablet (4 mg total) by mouth every evening. (Patient not taking: Reported on 7/11/2022)     No current facility-administered medications for this visit.       Review of patient's allergies indicates:   Allergen Reactions    Fluorouracil-adhesive bandage        Family History   Family history unknown: Yes       Social History     Socioeconomic History    Marital status: Other   Tobacco Use    Smoking status: Former Smoker    Smokeless tobacco: Never Used   Substance and Sexual Activity    Alcohol use: Not Currently    Drug use: Never         Review of Systems:    Constitution:   Denies chills, fever, and sweats.  HENT:   Denies headaches or blurry vision.  Cardiovascular:  Denies chest pain or irregular heart beat.  Respiratory:   Denies cough or shortness of breath.  Gastrointestinal:  Denies abdominal pain, nausea, or vomiting.  Musculoskeletal:   Denies muscle cramps.  Neurological:   Denies dizziness or focal weakness.  Psychiatric/Behavior: Normal mental status.  Hematology/Lymph:  Denies bleeding problem or easy bruising/bleeding.  Skin:    Denies rash or suspicious lesions.    Examination:    Vital Signs:    Vitals:    07/11/22 0900 07/11/22 0902   BP: 125/84    Pulse: 67    Temp: 97.6 °F (36.4 °C)    Weight: 75.2 kg (165 lb 12.8 oz)    Height: 5' 10" (1.778 m)    PainSc:   4   4       Body mass index is 23.79 kg/m².    Constitution:   Well-developed, well nourished patient in no acute distress.  Neurological:   Alert and oriented x 3 and cooperative to examination.     Psychiatric/Behavior: Normal mental status.  Respiratory:   No shortness of breath.  Eyes:    Extraoccular muscles intact  Skin:    No scars, rash or suspicious lesions.    Physical Exam:     Left knee exam  No effusion or erythema  Patellofemoral " crepitance noted  Pain with patellar compression  Pain over the lateral trochlea  Range of motion is from 0-125 degrees  Negative medial and lateral Lazaro  Negative Lachman's  No instability to varus valgus stress  Sensation intact distally  Pedal pulses 2+    Left knee radiographs reviewed showing some degenerative changes seen through the patellofemoral compartment        Assessment:     Osteoarthritis of left patellofemoral joint  -     methylPREDNISolone (MEDROL DOSEPACK) 4 mg tablet; use as directed  Dispense: 21 each; Refill: 0        Plan:      Discussed exam and x-ray findings with the patient today.  He has some patellofemoral osteoarthritis that has not responded to corticosteroid injection physical therapy and over-the-counter NSAIDs.  Recommend a Medrol Dosepak for some symptomatic pain relief as well as Synvisc 1 injection to the left knee.  He states that he has had that in the past which did provide some relief.  We will get this authorized and bring him back in once authorized.      DISCLAIMER: This note may have been dictated using voice recognition software and may contain grammatical errors.

## 2022-07-14 PROBLEM — G47.00 INSOMNIA: Status: ACTIVE | Noted: 2022-07-14

## 2022-07-27 ENCOUNTER — OFFICE VISIT (OUTPATIENT)
Dept: ORTHOPEDICS | Facility: CLINIC | Age: 52
End: 2022-07-27
Payer: COMMERCIAL

## 2022-07-27 VITALS
WEIGHT: 172 LBS | DIASTOLIC BLOOD PRESSURE: 84 MMHG | HEIGHT: 70 IN | TEMPERATURE: 98 F | BODY MASS INDEX: 24.62 KG/M2 | HEART RATE: 61 BPM | SYSTOLIC BLOOD PRESSURE: 125 MMHG

## 2022-07-27 DIAGNOSIS — M17.12 OSTEOARTHRITIS OF LEFT PATELLOFEMORAL JOINT: Primary | ICD-10-CM

## 2022-07-27 PROCEDURE — 3074F SYST BP LT 130 MM HG: CPT | Mod: CPTII,,, | Performed by: PHYSICIAN ASSISTANT

## 2022-07-27 PROCEDURE — 3008F PR BODY MASS INDEX (BMI) DOCUMENTED: ICD-10-PCS | Mod: CPTII,,, | Performed by: PHYSICIAN ASSISTANT

## 2022-07-27 PROCEDURE — 3079F PR MOST RECENT DIASTOLIC BLOOD PRESSURE 80-89 MM HG: ICD-10-PCS | Mod: CPTII,,, | Performed by: PHYSICIAN ASSISTANT

## 2022-07-27 PROCEDURE — 99499 UNLISTED E&M SERVICE: CPT | Mod: ,,, | Performed by: PHYSICIAN ASSISTANT

## 2022-07-27 PROCEDURE — 20610 LARGE JOINT ASPIRATION/INJECTION: L KNEE: ICD-10-PCS | Mod: LT,,, | Performed by: PHYSICIAN ASSISTANT

## 2022-07-27 PROCEDURE — 99499 NO LOS: ICD-10-PCS | Mod: ,,, | Performed by: PHYSICIAN ASSISTANT

## 2022-07-27 PROCEDURE — 20610 DRAIN/INJ JOINT/BURSA W/O US: CPT | Mod: LT,,, | Performed by: PHYSICIAN ASSISTANT

## 2022-07-27 PROCEDURE — 1160F RVW MEDS BY RX/DR IN RCRD: CPT | Mod: CPTII,,, | Performed by: PHYSICIAN ASSISTANT

## 2022-07-27 PROCEDURE — 3074F PR MOST RECENT SYSTOLIC BLOOD PRESSURE < 130 MM HG: ICD-10-PCS | Mod: CPTII,,, | Performed by: PHYSICIAN ASSISTANT

## 2022-07-27 PROCEDURE — 3008F BODY MASS INDEX DOCD: CPT | Mod: CPTII,,, | Performed by: PHYSICIAN ASSISTANT

## 2022-07-27 PROCEDURE — 3079F DIAST BP 80-89 MM HG: CPT | Mod: CPTII,,, | Performed by: PHYSICIAN ASSISTANT

## 2022-07-27 PROCEDURE — 1160F PR REVIEW ALL MEDS BY PRESCRIBER/CLIN PHARMACIST DOCUMENTED: ICD-10-PCS | Mod: CPTII,,, | Performed by: PHYSICIAN ASSISTANT

## 2022-07-27 PROCEDURE — 1159F MED LIST DOCD IN RCRD: CPT | Mod: CPTII,,, | Performed by: PHYSICIAN ASSISTANT

## 2022-07-27 PROCEDURE — 1159F PR MEDICATION LIST DOCUMENTED IN MEDICAL RECORD: ICD-10-PCS | Mod: CPTII,,, | Performed by: PHYSICIAN ASSISTANT

## 2022-07-27 RX ORDER — DICLOFENAC SODIUM 75 MG/1
75 TABLET, DELAYED RELEASE ORAL 2 TIMES DAILY
Qty: 60 TABLET | Refills: 0 | Status: SHIPPED | OUTPATIENT
Start: 2022-07-27 | End: 2022-08-26

## 2022-07-27 RX ORDER — LANSOPRAZOLE 30 MG/1
30 CAPSULE, DELAYED RELEASE ORAL
COMMUNITY
End: 2022-10-10

## 2022-07-27 NOTE — PROGRESS NOTES
History of present illness:    51-year-old male presents office today for Synvisc-One injection to the left knee.  No interval improvement with the Medrol Dosepak.  He continues to have anterior left knee pain  Past Medical History:   Diagnosis Date    Anxiety     Avascular necrosis of left femur     Avascular necrosis of right femur     Chondromalacia left knee     Chronic back pain     Fatigue     GERD (gastroesophageal reflux disease)     History of migraine     History of prostate cancer     HLD (hyperlipidemia)        Past Surgical History:   Procedure Laterality Date    ABLATION OF TISSUE OF CARDIAC SEPTUM USING ALCOHOL FOR HYPERTROPHIC OBSTRUCTIVE CARDIOMYOPATHY      COLONOSCOPY W/ POLYPECTOMY  07/30/2013    ESOPHAGOGASTRODUODENOSCOPY  07/05/2017    ESOPHAGOGASTRODUODENOSCOPY  12/09/2016    LAMINECTOMY      L5 S1    nica hip replacement Right 12/16/2021    NICA hip replacment  Left 10/26/2021    PROSTATE SURGERY      REPAIR OF MENISCUS OF KNEE         Current Outpatient Medications   Medication Sig    ALPRAZolam (XANAX XR) 0.5 MG Tb24 Take 1 tablet once to twice a day as needed for anxiety.    clonazePAM (KLONOPIN) 1 MG tablet   See Instructions, TAKE 1 TABLET BY MOUTH IN EVENING., # 30 tab(s), 5 Refill(s), Pharmacy: [facility], 177, cm, Height/Length Dosing, 04/07/22 9:02:00 CDT, 81.9, kg, Weight Dosing, 04/07/22 9:02:00 CDT    hydrOXYzine HCL (ATARAX) 25 MG tablet Take 1 tablet at bedtime.    lansoprazole (PREVACID) 30 MG capsule Take 30 mg by mouth.    nitroGLYCERIN (NITROSTAT) 0.4 MG SL tablet Place 0.4 mg under the tongue.    omeprazole (PRILOSEC) 40 MG capsule Take 40 mg by mouth once daily.    oxybutynin (DITROPAN) 5 MG Tab Take 5 mg by mouth nightly.    tadalafiL (CIALIS) 20 MG Tab TAKE ONE TABLET 1 TO 2 hours prior TO intercourse AS NEEDED    tadalafiL (CIALIS) 20 MG Tab Take 20 mg by mouth.    tiZANidine (ZANAFLEX) 4 MG tablet Take 1 tablet (4 mg total)  "by mouth every evening.    zolpidem (AMBIEN CR) 12.5 MG CR tablet Take 1 tablet (12.5 mg total) by mouth nightly as needed for Insomnia.    diclofenac (VOLTAREN) 75 MG EC tablet Take 1 tablet (75 mg total) by mouth 2 (two) times daily.    methylPREDNISolone (MEDROL DOSEPACK) 4 mg tablet use as directed (Patient not taking: Reported on 7/27/2022)     No current facility-administered medications for this visit.       Review of patient's allergies indicates:   Allergen Reactions    Adhesive Rash and Dermatitis       Family History   Problem Relation Age of Onset    Heart disease Mother     Heart block Father     Diabetes type I Father        Social History     Socioeconomic History    Marital status: Other   Tobacco Use    Smoking status: Former Smoker    Smokeless tobacco: Never Used   Substance and Sexual Activity    Alcohol use: Not Currently    Drug use: Never   Social History Narrative    ** Merged History Encounter **              Review of Systems:    Constitution:   Denies chills, fever, and sweats.  HENT:   Denies headaches or blurry vision.  Cardiovascular:  Denies chest pain or irregular heart beat.  Respiratory:   Denies cough or shortness of breath.  Gastrointestinal:  Denies abdominal pain, nausea, or vomiting.  Musculoskeletal:   Denies muscle cramps.  Neurological:   Denies dizziness or focal weakness.  Psychiatric/Behavior: Normal mental status.  Hematology/Lymph:  Denies bleeding problem or easy bruising/bleeding.  Skin:    Denies rash or suspicious lesions.    Examination:    Vital Signs:    Vitals:    07/27/22 1518   BP: 125/84   Pulse: 61   Temp: 97.6 °F (36.4 °C)   Weight: 78 kg (172 lb)   Height: 5' 10" (1.778 m)   PainSc:   8       Body mass index is 24.68 kg/m².    Constitution:   Well-developed, well nourished patient in no acute distress.  Neurological:   Alert and oriented x 3 and cooperative to examination.     Psychiatric/Behavior: Normal mental status.  Respiratory:   No " shortness of breath.  Eyes:    Extraoccular muscles intact  Skin:    No scars, rash or suspicious lesions.    Physical Exam:     Left knee exam  No effusion or erythema  Patellofemoral crepitance noted  Pain with patellar compression  Pain over the lateral trochlea  Range of motion is from 0-125 degrees  Negative medial and lateral Lazaro  Negative Lachman's  No instability to varus valgus stress  Sensation intact distally  Pedal pulses 2+    Left knee radiographs reviewed showing some degenerative changes seen through the patellofemoral compartment        Assessment:     Osteoarthritis of left patellofemoral joint  -     Large Joint Aspiration/Injection: L knee    Other orders  -     diclofenac (VOLTAREN) 75 MG EC tablet; Take 1 tablet (75 mg total) by mouth 2 (two) times daily.  Dispense: 60 tablet; Refill: 0        Plan:      Conservative treatment consisting of Synvisc-One injection to the left knee today.  Follow-up in 6 weeks for repeat evaluation    DISCLAIMER: This note may have been dictated using voice recognition software and may contain grammatical errors.

## 2022-07-27 NOTE — PROCEDURES
Large Joint Aspiration/Injection: L knee    Date/Time: 7/27/2022 3:15 PM  Performed by: LESTER Goldstein  Authorized by: LESTER Goldstein     Consent Done?:  Yes (Verbal)  Indications:  Pain  Site marked: the procedure site was marked    Timeout: prior to procedure the correct patient, procedure, and site was verified    Prep: patient was prepped and draped in usual sterile fashion      Local anesthesia used?: Yes    Local anesthetic:  Topical anesthetic and lidocaine 2% without epinephrine  Ultrasonic Guidance for needle placement?: No    Approach:  Superior (superolateral)  Location:  Knee  Site:  L knee  Medications:  48 mg hylan g-f 20 48 mg/6 mL  Patient tolerance:  Patient tolerated the procedure well with no immediate complications

## 2022-08-05 DIAGNOSIS — R14.0 GASTRIC TYMPANY: Primary | ICD-10-CM

## 2022-08-17 ENCOUNTER — TELEPHONE (OUTPATIENT)
Dept: ORTHOPEDICS | Facility: CLINIC | Age: 52
End: 2022-08-17
Payer: COMMERCIAL

## 2022-08-17 DIAGNOSIS — M62.830 MUSCLE SPASM OF BACK: ICD-10-CM

## 2022-08-31 ENCOUNTER — HOSPITAL ENCOUNTER (OUTPATIENT)
Dept: RADIOLOGY | Facility: HOSPITAL | Age: 52
Discharge: HOME | End: 2022-08-31
Attending: INTERNAL MEDICINE
Payer: COMMERCIAL

## 2022-08-31 DIAGNOSIS — R14.0 GASTRIC TYMPANY: ICD-10-CM

## 2022-08-31 PROCEDURE — A9537 TC99M MEBROFENIN: HCPCS

## 2022-08-31 PROCEDURE — 78226 HEPATOBILIARY SYSTEM IMAGING: CPT | Mod: TC

## 2022-09-07 ENCOUNTER — OFFICE VISIT (OUTPATIENT)
Dept: ORTHOPEDICS | Facility: CLINIC | Age: 52
End: 2022-09-07
Payer: COMMERCIAL

## 2022-09-07 VITALS
HEART RATE: 78 BPM | WEIGHT: 168 LBS | SYSTOLIC BLOOD PRESSURE: 128 MMHG | HEIGHT: 70 IN | DIASTOLIC BLOOD PRESSURE: 81 MMHG | BODY MASS INDEX: 24.05 KG/M2

## 2022-09-07 DIAGNOSIS — S83.242D ACUTE MEDIAL MENISCUS TEAR OF LEFT KNEE, SUBSEQUENT ENCOUNTER: Primary | ICD-10-CM

## 2022-09-07 DIAGNOSIS — M17.12 OSTEOARTHRITIS OF LEFT PATELLOFEMORAL JOINT: ICD-10-CM

## 2022-09-07 PROCEDURE — 99213 OFFICE O/P EST LOW 20 MIN: CPT | Mod: ,,, | Performed by: SPECIALIST

## 2022-09-07 PROCEDURE — 3079F PR MOST RECENT DIASTOLIC BLOOD PRESSURE 80-89 MM HG: ICD-10-PCS | Mod: CPTII,,, | Performed by: SPECIALIST

## 2022-09-07 PROCEDURE — 1159F PR MEDICATION LIST DOCUMENTED IN MEDICAL RECORD: ICD-10-PCS | Mod: CPTII,,, | Performed by: SPECIALIST

## 2022-09-07 PROCEDURE — 3008F PR BODY MASS INDEX (BMI) DOCUMENTED: ICD-10-PCS | Mod: CPTII,,, | Performed by: SPECIALIST

## 2022-09-07 PROCEDURE — 3074F PR MOST RECENT SYSTOLIC BLOOD PRESSURE < 130 MM HG: ICD-10-PCS | Mod: CPTII,,, | Performed by: SPECIALIST

## 2022-09-07 PROCEDURE — 1159F MED LIST DOCD IN RCRD: CPT | Mod: CPTII,,, | Performed by: SPECIALIST

## 2022-09-07 PROCEDURE — 1160F PR REVIEW ALL MEDS BY PRESCRIBER/CLIN PHARMACIST DOCUMENTED: ICD-10-PCS | Mod: CPTII,,, | Performed by: SPECIALIST

## 2022-09-07 PROCEDURE — 3079F DIAST BP 80-89 MM HG: CPT | Mod: CPTII,,, | Performed by: SPECIALIST

## 2022-09-07 PROCEDURE — 99213 PR OFFICE/OUTPT VISIT, EST, LEVL III, 20-29 MIN: ICD-10-PCS | Mod: ,,, | Performed by: SPECIALIST

## 2022-09-07 PROCEDURE — 3008F BODY MASS INDEX DOCD: CPT | Mod: CPTII,,, | Performed by: SPECIALIST

## 2022-09-07 PROCEDURE — 1160F RVW MEDS BY RX/DR IN RCRD: CPT | Mod: CPTII,,, | Performed by: SPECIALIST

## 2022-09-07 PROCEDURE — 3074F SYST BP LT 130 MM HG: CPT | Mod: CPTII,,, | Performed by: SPECIALIST

## 2022-09-07 NOTE — PROGRESS NOTES
History of present illness:    51-year-old male presents today for 6 week follow-up on his left knee.  He received a Synvisc injection at last visit with no relief.  He continues to have anterior as well as medial-sided knee pain.  He is now complaining of some locking and catching episodes.  This pain is constant.  Currently nothing alleviates his pain.  He has tried corticosteroid injection as well as physical therapy past with no relief.  Her  Past Medical History:   Diagnosis Date    Anxiety     Avascular necrosis of left femur     Avascular necrosis of right femur     Chondromalacia left knee     Chronic back pain     Fatigue     GERD (gastroesophageal reflux disease)     History of migraine     History of prostate cancer     HLD (hyperlipidemia)        Past Surgical History:   Procedure Laterality Date    ABLATION OF TISSUE OF CARDIAC SEPTUM USING ALCOHOL FOR HYPERTROPHIC OBSTRUCTIVE CARDIOMYOPATHY      COLONOSCOPY W/ POLYPECTOMY  07/30/2013    ESOPHAGOGASTRODUODENOSCOPY  07/05/2017    ESOPHAGOGASTRODUODENOSCOPY  12/09/2016    LAMINECTOMY      L5 S1    nica hip replacement Right 12/16/2021    NICA hip replacment  Left 10/26/2021    PROSTATE SURGERY      REPAIR OF MENISCUS OF KNEE         Current Outpatient Medications   Medication Sig    ALPRAZolam (XANAX XR) 0.5 MG Tb24 Take 1 tablet once to twice a day as needed for anxiety.    clonazePAM (KLONOPIN) 1 MG tablet   See Instructions, TAKE 1 TABLET BY MOUTH IN EVENING., # 30 tab(s), 5 Refill(s), Pharmacy: [facility], 177, cm, Height/Length Dosing, 04/07/22 9:02:00 CDT, 81.9, kg, Weight Dosing, 04/07/22 9:02:00 CDT    hydrOXYzine HCL (ATARAX) 25 MG tablet TAKE 1 TABLET BY MOUTH AT BEDTIME.    lansoprazole (PREVACID) 30 MG capsule Take 30 mg by mouth.    omeprazole (PRILOSEC) 40 MG capsule Take 40 mg by mouth once daily.    oxybutynin (DITROPAN) 5 MG Tab Take 5 mg by mouth nightly.    tadalafiL (CIALIS) 20 MG Tab Take 20 mg by mouth.     "tiZANidine (ZANAFLEX) 4 MG tablet Take 1 tablet (4 mg total) by mouth every evening.    zolpidem (AMBIEN CR) 12.5 MG CR tablet Take 1 tablet (12.5 mg total) by mouth nightly as needed for Insomnia.    nitroGLYCERIN (NITROSTAT) 0.4 MG SL tablet Place 0.4 mg under the tongue.     No current facility-administered medications for this visit.       Review of patient's allergies indicates:   Allergen Reactions    Adhesive Rash and Dermatitis       Family History   Problem Relation Age of Onset    Heart disease Mother     Heart block Father     Diabetes type I Father        Social History     Socioeconomic History    Marital status: Other   Tobacco Use    Smoking status: Former    Smokeless tobacco: Never   Substance and Sexual Activity    Alcohol use: Not Currently    Drug use: Never   Social History Narrative    ** Merged History Encounter **              Review of Systems:    Constitution:   Denies chills, fever, and sweats.  HENT:   Denies headaches or blurry vision.  Cardiovascular:  Denies chest pain or irregular heart beat.  Respiratory:   Denies cough or shortness of breath.  Gastrointestinal:  Denies abdominal pain, nausea, or vomiting.  Musculoskeletal:   Denies muscle cramps.  Neurological:   Denies dizziness or focal weakness.  Psychiatric/Behavior: Normal mental status.  Hematology/Lymph:  Denies bleeding problem or easy bruising/bleeding.  Skin:    Denies rash or suspicious lesions.    Examination:    Vital Signs:    Vitals:    09/07/22 1256 09/07/22 1259   BP: 128/81    Pulse: 78    Weight: 76.2 kg (168 lb)    Height: 5' 10" (1.778 m)    PainSc:    7       Body mass index is 24.11 kg/m².    Constitution:   Well-developed, well nourished patient in no acute distress.  Neurological:   Alert and oriented x 3 and cooperative to examination.     Psychiatric/Behavior: Normal mental status.  Respiratory:   No shortness of breath.  Eyes:    Extraoccular muscles intact  Skin:    No scars, rash or suspicious " lesions.    Physical Exam:     Left knee exam  No effusion or erythema  Patellofemoral crepitance noted  Pain with patellar compression  Pain over the lateral trochlea  Range of motion is from 0-125 degrees  Positive medial Lazaro  Negative Lachman's  No instability to varus valgus stress  Sensation intact distally  Pedal pulses 2+    Left knee radiographs reviewed showing some degenerative changes seen through the patellofemoral compartment        Assessment:     Acute medial meniscus tear of left knee, subsequent encounter  -     MRI Knee Without Contrast Left; Future; Expected date: 09/07/2022    Osteoarthritis of left patellofemoral joint  -     MRI Knee Without Contrast Left; Future; Expected date: 09/07/2022      Plan:    The patient has had no pain relief with conservative treatment consisting of corticosteroid injection, Synvisc injection, physical therapy as well as NSAIDs.  Recommend MRI of the left knee to evaluate for a displaced medial meniscus tear.  He will follow up in will 2 weeks to discuss test results    DISCLAIMER: This note may have been dictated using voice recognition software and may contain grammatical errors.

## 2022-09-21 ENCOUNTER — OFFICE VISIT (OUTPATIENT)
Dept: ORTHOPEDICS | Facility: CLINIC | Age: 52
End: 2022-09-21
Payer: COMMERCIAL

## 2022-09-21 VITALS
DIASTOLIC BLOOD PRESSURE: 88 MMHG | BODY MASS INDEX: 24.62 KG/M2 | SYSTOLIC BLOOD PRESSURE: 135 MMHG | HEIGHT: 70 IN | TEMPERATURE: 98 F | HEART RATE: 91 BPM | WEIGHT: 172 LBS

## 2022-09-21 DIAGNOSIS — S83.242D ACUTE MEDIAL MENISCUS TEAR OF LEFT KNEE, SUBSEQUENT ENCOUNTER: Primary | ICD-10-CM

## 2022-09-21 PROCEDURE — 20610 DRAIN/INJ JOINT/BURSA W/O US: CPT | Mod: LT,,, | Performed by: PHYSICIAN ASSISTANT

## 2022-09-21 PROCEDURE — 1160F RVW MEDS BY RX/DR IN RCRD: CPT | Mod: CPTII,,, | Performed by: PHYSICIAN ASSISTANT

## 2022-09-21 PROCEDURE — 1159F MED LIST DOCD IN RCRD: CPT | Mod: CPTII,,, | Performed by: PHYSICIAN ASSISTANT

## 2022-09-21 PROCEDURE — 3079F PR MOST RECENT DIASTOLIC BLOOD PRESSURE 80-89 MM HG: ICD-10-PCS | Mod: CPTII,,, | Performed by: PHYSICIAN ASSISTANT

## 2022-09-21 PROCEDURE — 20610 LARGE JOINT ASPIRATION/INJECTION: L KNEE: ICD-10-PCS | Mod: LT,,, | Performed by: PHYSICIAN ASSISTANT

## 2022-09-21 PROCEDURE — 1159F PR MEDICATION LIST DOCUMENTED IN MEDICAL RECORD: ICD-10-PCS | Mod: CPTII,,, | Performed by: PHYSICIAN ASSISTANT

## 2022-09-21 PROCEDURE — 3075F PR MOST RECENT SYSTOLIC BLOOD PRESS GE 130-139MM HG: ICD-10-PCS | Mod: CPTII,,, | Performed by: PHYSICIAN ASSISTANT

## 2022-09-21 PROCEDURE — 99213 PR OFFICE/OUTPT VISIT, EST, LEVL III, 20-29 MIN: ICD-10-PCS | Mod: 25,,, | Performed by: PHYSICIAN ASSISTANT

## 2022-09-21 PROCEDURE — 3075F SYST BP GE 130 - 139MM HG: CPT | Mod: CPTII,,, | Performed by: PHYSICIAN ASSISTANT

## 2022-09-21 PROCEDURE — 3008F BODY MASS INDEX DOCD: CPT | Mod: CPTII,,, | Performed by: PHYSICIAN ASSISTANT

## 2022-09-21 PROCEDURE — 3008F PR BODY MASS INDEX (BMI) DOCUMENTED: ICD-10-PCS | Mod: CPTII,,, | Performed by: PHYSICIAN ASSISTANT

## 2022-09-21 PROCEDURE — 3079F DIAST BP 80-89 MM HG: CPT | Mod: CPTII,,, | Performed by: PHYSICIAN ASSISTANT

## 2022-09-21 PROCEDURE — 99213 OFFICE O/P EST LOW 20 MIN: CPT | Mod: 25,,, | Performed by: PHYSICIAN ASSISTANT

## 2022-09-21 PROCEDURE — 1160F PR REVIEW ALL MEDS BY PRESCRIBER/CLIN PHARMACIST DOCUMENTED: ICD-10-PCS | Mod: CPTII,,, | Performed by: PHYSICIAN ASSISTANT

## 2022-09-21 RX ORDER — BETAMETHASONE SODIUM PHOSPHATE AND BETAMETHASONE ACETATE 3; 3 MG/ML; MG/ML
12 INJECTION, SUSPENSION INTRA-ARTICULAR; INTRALESIONAL; INTRAMUSCULAR; SOFT TISSUE
Status: DISCONTINUED | OUTPATIENT
Start: 2022-09-21 | End: 2022-09-21 | Stop reason: HOSPADM

## 2022-09-21 RX ADMIN — BETAMETHASONE SODIUM PHOSPHATE AND BETAMETHASONE ACETATE 12 MG: 3; 3 INJECTION, SUSPENSION INTRA-ARTICULAR; INTRALESIONAL; INTRAMUSCULAR; SOFT TISSUE at 02:09

## 2022-09-21 NOTE — PROGRESS NOTES
History of present illness:    51-year-old male presents today for for follow-up on his left knee.  Originally the point was made for MRI test results the left knee.  He is set up next week for his MRI but he is requesting another cortisone injection today as he has a gallbladder surgery planned for next week and would like to have some pain relief prior to that.  Past Medical History:   Diagnosis Date    Anxiety     Avascular necrosis of left femur     Avascular necrosis of right femur     Biliary dyskinesia     Chondromalacia left knee     Chronic back pain     Fatigue     GERD (gastroesophageal reflux disease)     History of migraine     History of prostate cancer     HLD (hyperlipidemia)        Past Surgical History:   Procedure Laterality Date    ABLATION OF TISSUE OF CARDIAC SEPTUM USING ALCOHOL FOR HYPERTROPHIC OBSTRUCTIVE CARDIOMYOPATHY      COLONOSCOPY W/ POLYPECTOMY  07/30/2013    ESOPHAGOGASTRODUODENOSCOPY  07/05/2017    ESOPHAGOGASTRODUODENOSCOPY  12/09/2016    LAMINECTOMY      L5 S1    nica hip replacement Right 12/16/2021    NICA hip replacment  Left 10/26/2021    PROSTATE SURGERY      REPAIR OF MENISCUS OF KNEE         Current Outpatient Medications   Medication Sig    ALPRAZolam (XANAX) 0.5 MG tablet Take 1 tablet once to twice a day as needed for anxiety.    clonazePAM (KLONOPIN) 1 MG tablet   See Instructions, TAKE 1 TABLET BY MOUTH IN EVENING., # 30 tab(s), 5 Refill(s), Pharmacy: [facility], 177, cm, Height/Length Dosing, 04/07/22 9:02:00 CDT, 81.9, kg, Weight Dosing, 04/07/22 9:02:00 CDT    hydrOXYzine HCL (ATARAX) 25 MG tablet TAKE 1 TABLET BY MOUTH AT BEDTIME.    lansoprazole (PREVACID) 30 MG capsule Take 30 mg by mouth.    nitroGLYCERIN (NITROSTAT) 0.4 MG SL tablet Place 0.4 mg under the tongue.    omeprazole (PRILOSEC) 40 MG capsule Take 40 mg by mouth once daily.    oxybutynin (DITROPAN) 5 MG Tab Take 5 mg by mouth nightly.    tadalafiL (CIALIS) 20 MG Tab Take 20 mg by  "mouth.    tiZANidine (ZANAFLEX) 4 MG tablet Take 1 tablet (4 mg total) by mouth every evening.    zolpidem (AMBIEN CR) 12.5 MG CR tablet Take 1 tablet (12.5 mg total) by mouth nightly as needed for Insomnia.     No current facility-administered medications for this visit.       Review of patient's allergies indicates:   Allergen Reactions    Adhesive Rash and Dermatitis       Family History   Problem Relation Age of Onset    Heart disease Mother     Heart block Father     Diabetes type I Father        Social History     Socioeconomic History    Marital status: Other   Tobacco Use    Smoking status: Former    Smokeless tobacco: Never   Substance and Sexual Activity    Alcohol use: Not Currently    Drug use: Never   Social History Narrative    ** Merged History Encounter **              Review of Systems:    Constitution:   Denies chills, fever, and sweats.  HENT:   Denies headaches or blurry vision.  Cardiovascular:  Denies chest pain or irregular heart beat.  Respiratory:   Denies cough or shortness of breath.  Gastrointestinal:  Denies abdominal pain, nausea, or vomiting.  Musculoskeletal:   Denies muscle cramps.  Neurological:   Denies dizziness or focal weakness.  Psychiatric/Behavior: Normal mental status.  Hematology/Lymph:  Denies bleeding problem or easy bruising/bleeding.  Skin:    Denies rash or suspicious lesions.    Examination:    Vital Signs:    Vitals:    09/21/22 1342 09/21/22 1343   BP: 135/88    Pulse: 91    Temp: 97.6 °F (36.4 °C)    Weight: 78 kg (172 lb)    Height: 5' 10" (1.778 m)    PainSc:   6   6       Body mass index is 24.68 kg/m².    Constitution:   Well-developed, well nourished patient in no acute distress.  Neurological:   Alert and oriented x 3 and cooperative to examination.     Psychiatric/Behavior: Normal mental status.  Respiratory:   No shortness of breath.  Eyes:    Extraoccular muscles intact  Skin:    No scars, rash or suspicious lesions.    Physical Exam:     Left knee " exam  No effusion or erythema  Patellofemoral crepitance noted  Pain with patellar compression  Pain over the lateral trochlea  Range of motion is from 0-125 degrees  Positive medial Lazaro  Negative Lachman's  No instability to varus valgus stress  Sensation intact distally  Pedal pulses 2+    Left knee radiographs reviewed showing some degenerative changes seen through the patellofemoral compartment        Assessment:     Acute medial meniscus tear of left knee, subsequent encounter  -     Large Joint Aspiration/Injection: L knee      Plan:    Corticosteroid injection to the left knee today.  He is set for his MRI of the left knee next Monday and we will phone him in his test results as he will be recovering from his cholecystectomy.  Depending on his test results we will set him up with a follow-up appointment and further treatment plan    DISCLAIMER: This note may have been dictated using voice recognition software and may contain grammatical errors.

## 2022-09-21 NOTE — PROCEDURES
Large Joint Aspiration/Injection: L knee    Date/Time: 9/21/2022 2:00 PM  Performed by: LESTER Goldstein  Authorized by: LESTER Goldstein     Consent Done?:  Yes (Verbal)  Indications:  Arthritis  Timeout: prior to procedure the correct patient, procedure, and site was verified    Prep: patient was prepped and draped in usual sterile fashion      Local anesthesia used?: Yes    Local anesthetic:  Lidocaine 2% without epinephrine    Details:  Needle Size:  25 G  Ultrasonic Guidance for needle placement?: No    Location:  Knee  Site:  L knee  Medications:  12 mg betamethasone acetate-betamethasone sodium phosphate 6 mg/mL  Patient tolerance:  Patient tolerated the procedure well with no immediate complications

## 2022-09-26 ENCOUNTER — HOSPITAL ENCOUNTER (OUTPATIENT)
Dept: RADIOLOGY | Facility: HOSPITAL | Age: 52
Discharge: HOME | End: 2022-09-26
Payer: COMMERCIAL

## 2022-09-26 ENCOUNTER — PATIENT MESSAGE (OUTPATIENT)
Dept: ADMINISTRATIVE | Facility: OTHER | Age: 52
End: 2022-09-26
Payer: COMMERCIAL

## 2022-09-26 DIAGNOSIS — Z01.818 OTHER SPECIFIED PRE-OPERATIVE EXAMINATION: Primary | ICD-10-CM

## 2022-09-26 DIAGNOSIS — Z01.818 OTHER SPECIFIED PRE-OPERATIVE EXAMINATION: ICD-10-CM

## 2022-09-26 PROCEDURE — 71046 X-RAY EXAM CHEST 2 VIEWS: CPT | Mod: TC

## 2022-09-29 PROBLEM — K82.8 BILIARY DYSKINESIA: Status: ACTIVE | Noted: 2022-09-29

## 2022-10-03 ENCOUNTER — DOCUMENTATION ONLY (OUTPATIENT)
Dept: ORTHOPEDICS | Facility: CLINIC | Age: 52
End: 2022-10-03

## 2022-10-03 ENCOUNTER — OFFICE VISIT (OUTPATIENT)
Dept: ORTHOPEDICS | Facility: CLINIC | Age: 52
End: 2022-10-03
Payer: COMMERCIAL

## 2022-10-03 VITALS
WEIGHT: 161.81 LBS | BODY MASS INDEX: 23.17 KG/M2 | HEART RATE: 82 BPM | SYSTOLIC BLOOD PRESSURE: 128 MMHG | DIASTOLIC BLOOD PRESSURE: 87 MMHG | HEIGHT: 70 IN

## 2022-10-03 DIAGNOSIS — M94.262 CHONDROMALACIA, LEFT KNEE: Primary | ICD-10-CM

## 2022-10-03 PROCEDURE — 99213 OFFICE O/P EST LOW 20 MIN: CPT | Mod: ,,, | Performed by: SPECIALIST

## 2022-10-03 PROCEDURE — 3008F BODY MASS INDEX DOCD: CPT | Mod: CPTII,,, | Performed by: SPECIALIST

## 2022-10-03 PROCEDURE — 3008F PR BODY MASS INDEX (BMI) DOCUMENTED: ICD-10-PCS | Mod: CPTII,,, | Performed by: SPECIALIST

## 2022-10-03 PROCEDURE — 3079F DIAST BP 80-89 MM HG: CPT | Mod: CPTII,,, | Performed by: SPECIALIST

## 2022-10-03 PROCEDURE — 1159F MED LIST DOCD IN RCRD: CPT | Mod: CPTII,,, | Performed by: SPECIALIST

## 2022-10-03 PROCEDURE — 3074F SYST BP LT 130 MM HG: CPT | Mod: CPTII,,, | Performed by: SPECIALIST

## 2022-10-03 PROCEDURE — 1159F PR MEDICATION LIST DOCUMENTED IN MEDICAL RECORD: ICD-10-PCS | Mod: CPTII,,, | Performed by: SPECIALIST

## 2022-10-03 PROCEDURE — 3079F PR MOST RECENT DIASTOLIC BLOOD PRESSURE 80-89 MM HG: ICD-10-PCS | Mod: CPTII,,, | Performed by: SPECIALIST

## 2022-10-03 PROCEDURE — 99213 PR OFFICE/OUTPT VISIT, EST, LEVL III, 20-29 MIN: ICD-10-PCS | Mod: ,,, | Performed by: SPECIALIST

## 2022-10-03 PROCEDURE — 3074F PR MOST RECENT SYSTOLIC BLOOD PRESSURE < 130 MM HG: ICD-10-PCS | Mod: CPTII,,, | Performed by: SPECIALIST

## 2022-10-03 NOTE — PROGRESS NOTES
Past Medical History:   Diagnosis Date    Anxiety     Avascular necrosis of left femur     Avascular necrosis of right femur     Biliary dyskinesia     Chondromalacia left knee     Chronic back pain     Fatigue     GERD (gastroesophageal reflux disease)     History of migraine     History of prostate cancer     HLD (hyperlipidemia)        Past Surgical History:   Procedure Laterality Date    ABLATION OF TISSUE OF CARDIAC SEPTUM USING ALCOHOL FOR HYPERTROPHIC OBSTRUCTIVE CARDIOMYOPATHY      CHOLECYSTECTOMY      COLONOSCOPY W/ POLYPECTOMY  07/30/2013    ESOPHAGOGASTRODUODENOSCOPY  07/05/2017    ESOPHAGOGASTRODUODENOSCOPY  12/09/2016    LAMINECTOMY      L5 S1    nica hip replacement Right 12/16/2021    NICA hip replacment  Left 10/26/2021    PROSTATE SURGERY      REPAIR OF MENISCUS OF KNEE         Current Outpatient Medications   Medication Sig    ALPRAZolam (XANAX) 0.5 MG tablet Take 1 tablet once to twice a day as needed for anxiety.    clonazePAM (KLONOPIN) 1 MG tablet   See Instructions, TAKE 1 TABLET BY MOUTH IN EVENING., # 30 tab(s), 5 Refill(s), Pharmacy: [facility], 177, cm, Height/Length Dosing, 04/07/22 9:02:00 CDT, 81.9, kg, Weight Dosing, 04/07/22 9:02:00 CDT    HYDROcodone-acetaminophen (NORCO) 7.5-325 mg per tablet Take 1 tablet by mouth every 6 (six) hours as needed for Pain.    hydrOXYzine HCL (ATARAX) 25 MG tablet TAKE 1 TABLET BY MOUTH AT BEDTIME.    lansoprazole (PREVACID) 30 MG capsule Take 30 mg by mouth.    nitroGLYCERIN (NITROSTAT) 0.4 MG SL tablet Place 0.4 mg under the tongue.    omeprazole (PRILOSEC) 40 MG capsule Take 40 mg by mouth once daily.    oxybutynin (DITROPAN) 5 MG Tab Take 5 mg by mouth nightly.    tadalafiL (CIALIS) 20 MG Tab Take 20 mg by mouth.    tiZANidine (ZANAFLEX) 4 MG tablet Take 1 tablet (4 mg total) by mouth every evening.    zolpidem (AMBIEN CR) 12.5 MG CR tablet Take 1 tablet (12.5 mg total) by mouth nightly as needed for Insomnia.     No current  "facility-administered medications for this visit.       Review of patient's allergies indicates:   Allergen Reactions    Adhesive Rash and Dermatitis       Family History   Problem Relation Age of Onset    Heart disease Mother     Heart block Father     Diabetes type I Father        Social History     Socioeconomic History    Marital status: Single   Tobacco Use    Smoking status: Former    Smokeless tobacco: Never   Substance and Sexual Activity    Alcohol use: Not Currently    Drug use: Never   Social History Narrative    ** Merged History Encounter **            Chief Complaint:   Chief Complaint   Patient presents with    Results     MRI Results today. Pt had a cholecystectomy on 09/29/22 and he is slightly sore today. FIY.       Consulting Physician: No ref. provider found    History of present illness:    This is a 52 y.o. year old male who is here for MRI results of his left knee.  He is 4 days postop from cholecystectomy and is slightly sore today.  He is ambulatory.  His left knee has not bothered him much the past 4 days because he has been taking it easy as he recovered from his cholecystectomy.  Review of Systems:    Constitution:   Denies chills, fever, and sweats.  HENT:   Denies headaches or blurry vision.  Cardiovascular:  Denies chest pain or irregular heart beat.  Respiratory:   Denies cough or shortness of breath.  Gastrointestinal:  Denies abdominal pain, nausea, or vomiting.  Musculoskeletal:   Denies muscle cramps.  Neurological:   Denies dizziness or focal weakness.  Psychiatric/Behavior: Normal mental status.  Hematology/Lymph:  Denies bleeding problem or easy bruising/bleeding.  Skin:    Denies rash or suspicious lesions.    Examination:    Vital Signs:    Vitals:    10/03/22 1245   BP: 128/87   Pulse: 82   Weight: 73.4 kg (161 lb 12.8 oz)   Height: 5' 10" (1.778 m)       Body mass index is 23.22 kg/m².    Constitution:   Well-developed, well nourished patient in no acute " distress.  Neurological:   Alert and oriented x 3 and cooperative to examination.     Psychiatric/Behavior: Normal mental status.  Respiratory:   No shortness of breath.  Eyes:    Extraoccular muscles intact  Skin:    No scars, rash or suspicious lesions.    Physical Exam:  Left knee exam:   Quad atrophy   Patellofemoral crepitus  Tenderness over the lateral femoral condyle and lateral joint line   Negative Lazaro's test today   No pathologic laxity of the cruciate or collateral ligaments  Normal gait   2+ pulses with normal sensory and motor function     MRI consistent with some lateral femoral condyle articular cartilage fissuring.  No evidence of full thickness cartilage loss.  No meniscus tear.  No ligament tears.        Assessment: Chondromalacia, left knee        Plan:  Physical therapy when cleared by General surgery      DISCLAIMER: This note may have been dictated using voice recognition software and may contain grammatical errors.     NOTE: Consult report sent to referring provider via [employer] EMR.

## 2022-10-07 PROBLEM — Z00.00 WELLNESS EXAMINATION: Status: ACTIVE | Noted: 2022-10-07

## 2022-10-07 PROBLEM — M54.9 CHRONIC BACK PAIN: Status: ACTIVE | Noted: 2022-10-07

## 2022-10-07 PROBLEM — Z85.46 HISTORY OF PROSTATE CANCER: Status: ACTIVE | Noted: 2022-10-07

## 2022-10-07 PROBLEM — K21.9 GERD (GASTROESOPHAGEAL REFLUX DISEASE): Status: ACTIVE | Noted: 2022-10-07

## 2022-10-07 PROBLEM — G89.29 CHRONIC BACK PAIN: Status: ACTIVE | Noted: 2022-10-07

## 2022-10-07 PROBLEM — E78.5 HLD (HYPERLIPIDEMIA): Status: ACTIVE | Noted: 2022-10-07

## 2022-10-19 DIAGNOSIS — M94.262 CHONDROMALACIA, LEFT KNEE: Primary | ICD-10-CM

## 2023-01-09 ENCOUNTER — OFFICE VISIT (OUTPATIENT)
Dept: ORTHOPEDICS | Facility: CLINIC | Age: 53
End: 2023-01-09
Payer: COMMERCIAL

## 2023-01-09 ENCOUNTER — HOSPITAL ENCOUNTER (OUTPATIENT)
Dept: RADIOLOGY | Facility: CLINIC | Age: 53
Discharge: HOME | End: 2023-01-09
Attending: SPECIALIST
Payer: COMMERCIAL

## 2023-01-09 VITALS
WEIGHT: 163 LBS | HEART RATE: 76 BPM | SYSTOLIC BLOOD PRESSURE: 143 MMHG | DIASTOLIC BLOOD PRESSURE: 79 MMHG | BODY MASS INDEX: 23.34 KG/M2 | HEIGHT: 70 IN

## 2023-01-09 DIAGNOSIS — Z96.641 HISTORY OF TOTAL HIP ARTHROPLASTY, RIGHT: ICD-10-CM

## 2023-01-09 DIAGNOSIS — M94.262 CHONDROMALACIA, LEFT KNEE: ICD-10-CM

## 2023-01-09 DIAGNOSIS — Z96.641 HISTORY OF TOTAL HIP ARTHROPLASTY, RIGHT: Primary | ICD-10-CM

## 2023-01-09 PROBLEM — Z00.00 WELLNESS EXAMINATION: Status: RESOLVED | Noted: 2022-10-07 | Resolved: 2023-01-09

## 2023-01-09 PROCEDURE — 1159F PR MEDICATION LIST DOCUMENTED IN MEDICAL RECORD: ICD-10-PCS | Mod: CPTII,,, | Performed by: SPECIALIST

## 2023-01-09 PROCEDURE — 1160F PR REVIEW ALL MEDS BY PRESCRIBER/CLIN PHARMACIST DOCUMENTED: ICD-10-PCS | Mod: CPTII,,, | Performed by: SPECIALIST

## 2023-01-09 PROCEDURE — 3077F PR MOST RECENT SYSTOLIC BLOOD PRESSURE >= 140 MM HG: ICD-10-PCS | Mod: CPTII,,, | Performed by: SPECIALIST

## 2023-01-09 PROCEDURE — 3078F DIAST BP <80 MM HG: CPT | Mod: CPTII,,, | Performed by: SPECIALIST

## 2023-01-09 PROCEDURE — 3008F BODY MASS INDEX DOCD: CPT | Mod: CPTII,,, | Performed by: SPECIALIST

## 2023-01-09 PROCEDURE — 1160F RVW MEDS BY RX/DR IN RCRD: CPT | Mod: CPTII,,, | Performed by: SPECIALIST

## 2023-01-09 PROCEDURE — 73502 XR HIP WITH PELVIS WHEN PERFORMED, 2 OR 3  VIEWS RIGHT: ICD-10-PCS | Mod: RT,,, | Performed by: SPECIALIST

## 2023-01-09 PROCEDURE — 3077F SYST BP >= 140 MM HG: CPT | Mod: CPTII,,, | Performed by: SPECIALIST

## 2023-01-09 PROCEDURE — 3078F PR MOST RECENT DIASTOLIC BLOOD PRESSURE < 80 MM HG: ICD-10-PCS | Mod: CPTII,,, | Performed by: SPECIALIST

## 2023-01-09 PROCEDURE — 99213 OFFICE O/P EST LOW 20 MIN: CPT | Mod: ,,, | Performed by: SPECIALIST

## 2023-01-09 PROCEDURE — 99213 PR OFFICE/OUTPT VISIT, EST, LEVL III, 20-29 MIN: ICD-10-PCS | Mod: ,,, | Performed by: SPECIALIST

## 2023-01-09 PROCEDURE — 73502 X-RAY EXAM HIP UNI 2-3 VIEWS: CPT | Mod: RT,,, | Performed by: SPECIALIST

## 2023-01-09 PROCEDURE — 1159F MED LIST DOCD IN RCRD: CPT | Mod: CPTII,,, | Performed by: SPECIALIST

## 2023-01-09 PROCEDURE — 3008F PR BODY MASS INDEX (BMI) DOCUMENTED: ICD-10-PCS | Mod: CPTII,,, | Performed by: SPECIALIST

## 2023-01-09 NOTE — PROGRESS NOTES
Past Medical History:   Diagnosis Date    Anxiety     Avascular necrosis of left femur     Avascular necrosis of right femur     Biliary dyskinesia     Chondromalacia left knee     Chronic back pain     Fatigue     GERD (gastroesophageal reflux disease)     History of migraine     History of prostate cancer     HLD (hyperlipidemia)        Past Surgical History:   Procedure Laterality Date    ABLATION OF TISSUE OF CARDIAC SEPTUM USING ALCOHOL FOR HYPERTROPHIC OBSTRUCTIVE CARDIOMYOPATHY      CHOLECYSTECTOMY      COLONOSCOPY W/ POLYPECTOMY  07/30/2013    ESOPHAGOGASTRODUODENOSCOPY  07/05/2017    ESOPHAGOGASTRODUODENOSCOPY  12/09/2016    LAMINECTOMY      L5 S1    LAPAROSCOPIC CHOLECYSTECTOMY N/A 9/29/2022    Procedure: CHOLECYSTECTOMY, LAPAROSCOPIC;  Surgeon: Jesus Delcid Jr., MD;  Location: [facility];  Service: General;  Laterality: N/A;    nica hip replacement Right 12/16/2021    NICA hip replacment  Left 10/26/2021    PROSTATE SURGERY      REPAIR OF MENISCUS OF KNEE         Current Outpatient Medications   Medication Sig    ALPRAZolam (XANAX) 0.5 MG tablet TAKE ONE TABLET BY MOUTH TWICE DAILY AS NEEDED FOR ANXIETY    atorvastatin (LIPITOR) 20 MG tablet Take 1 tablet (20 mg total) by mouth once daily.    clonazePAM (KLONOPIN) 1 MG tablet Take 1 tablet (1 mg total) by mouth every evening.    HYDROcodone-acetaminophen (NORCO) 7.5-325 mg per tablet Take 1 tablet by mouth every 6 (six) hours as needed for Pain.    hydrOXYzine HCL (ATARAX) 25 MG tablet TAKE 1 TABLET BY MOUTH AT BEDTIME.    nitroGLYCERIN (NITROSTAT) 0.4 MG SL tablet Place 0.4 mg under the tongue.    omeprazole (PRILOSEC) 40 MG capsule Take 40 mg by mouth once daily.    oxybutynin (DITROPAN) 5 MG Tab Take 5 mg by mouth nightly.    tadalafiL (CIALIS) 20 MG Tab Take 20 mg by mouth.    tiZANidine (ZANAFLEX) 4 MG tablet Take 1 tablet (4 mg total) by mouth every evening.    zolpidem (AMBIEN CR) 12.5 MG CR tablet Take 1 tablet (12.5 mg total) by mouth nightly as  needed for Insomnia.     No current facility-administered medications for this visit.       Review of patient's allergies indicates:   Allergen Reactions    Adhesive Rash, Dermatitis and Other (See Comments)       Family History   Problem Relation Age of Onset    Heart disease Mother     Heart block Father     Diabetes type I Father     No Known Problems Sister        Social History     Socioeconomic History    Marital status:     Number of children: 0   Occupational History    Occupation: self employed   Tobacco Use    Smoking status: Former    Smokeless tobacco: Never   Substance and Sexual Activity    Alcohol use: Not Currently    Drug use: Never    Sexual activity: Yes   Social History Narrative    ** Merged History Encounter **            Chief Complaint:   Chief Complaint   Patient presents with    Follow-up     Pt reported in clinic today for his f/u Rt ANTONIO on 12/16/22. Pt states he is feeling dizzy and lightheaded today. Regards his hip patient denies any major pain, but complains about his knees. Pain feels like an ache that aggravates when he walks.Pt is attending PT 3x a week and patient only have few visits left.       Consulting Physician: No ref. provider found    History of present illness:    This is a 52 y.o. year old male who complains of continued left knee pain.  He has chondromalacia in his left knee is currently undergoing physical therapy with associated modalities.  He would like to continue this.  He is no pain in either the right or left hips.    Review of Systems:    Constitution:   Denies chills, fever, and sweats.  HENT:   Denies headaches or blurry vision.  Cardiovascular:  Denies chest pain or irregular heart beat.  Respiratory:   Denies cough or shortness of breath.  Gastrointestinal:  Denies abdominal pain, nausea, or vomiting.  Musculoskeletal:   Denies muscle cramps.  Neurological:   Denies dizziness or focal weakness.  Psychiatric/Behavior: Normal mental  "status.  Hematology/Lymph:  Denies bleeding problem or easy bruising/bleeding.  Skin:    Denies rash or suspicious lesions.    Examination:    Vital Signs:    Vitals:    01/09/23 1321 01/09/23 1323   BP: (!) 143/79    Pulse: 76    Weight: 73.9 kg (163 lb)    Height: 5' 10" (1.778 m)    PainSc:    5       Body mass index is 23.39 kg/m².    Constitution:   Well-developed, well nourished patient in no acute distress.  Neurological:   Alert and oriented x 3 and cooperative to examination.     Psychiatric/Behavior: Normal mental status.  Respiratory:   No shortness of breath.  Eyes:    Extraoccular muscles intact  Skin:    No scars, rash or suspicious lesions.    Physical Exam:  Right and left hip exam:   No swelling, no redness, no increased heat  Well-healed incisions   No tenderness in the right or left hips to palpation or with range of motion   Normal range motion of both hips   Normal gait   No gluteal atrophy  Atrophy of the left quad   Tenderness in the patellofemoral and lateral compartments of the left knee   Negative Lazaro's test  2+ pulses with normal sensory and motor function    Imaging: X-rays ordered and images interpreted today personally by me of AP pelvis and AP and lateral of the right hip which showed pristine interfaces and stable well-aligned right and left total hip arthroplasties with no evidence of loosening.  Impression:  Stable well-aligned right and left total hip arthroplasties.         Assessment: History of total hip arthroplasty, right  -     X-Ray Hip 2 or 3 views Right (with Pelvis when performed); Future; Expected date: 01/09/2023        Plan:  Continue with therapy for his left knee   Follow-up p.r.n.      DISCLAIMER: This note may have been dictated using voice recognition software and may contain grammatical errors.     NOTE: Consult report sent to referring provider via [employer] EMR.    "

## 2023-01-10 PROBLEM — R42 VERTIGO: Status: ACTIVE | Noted: 2023-01-10

## 2023-05-17 PROBLEM — M79.605 CHRONIC PAIN OF LEFT LOWER EXTREMITY: Status: ACTIVE | Noted: 2023-05-17

## 2023-05-17 PROBLEM — G89.29 CHRONIC PAIN OF LEFT LOWER EXTREMITY: Status: ACTIVE | Noted: 2023-05-17

## 2023-07-18 PROBLEM — R22.2: Status: ACTIVE | Noted: 2023-07-18

## 2023-08-14 PROBLEM — F51.01 PRIMARY INSOMNIA: Status: ACTIVE | Noted: 2022-07-14

## 2024-03-28 ENCOUNTER — TELEPHONE (OUTPATIENT)
Dept: ORTHOPEDICS | Facility: CLINIC | Age: 54
End: 2024-03-28
Payer: COMMERCIAL

## 2024-03-28 RX ORDER — AMOXICILLIN 500 MG/1
500 CAPSULE ORAL ONCE
Qty: 4 CAPSULE | Refills: 0 | Status: SHIPPED | OUTPATIENT
Start: 2024-03-28 | End: 2024-03-28

## 2024-03-28 NOTE — TELEPHONE ENCOUNTER
Patient called needing to pre medicate for his dental procedure he hasn't scheduled an appointment yet but thinks he might have a cavity preferred pharmacy on file is correct.  Spoke with the patient to let him know that  is in surgery but once they're out they will fill the medication to just check with them first and let us know if they're having any issues, patient agreed with this plan.

## 2024-07-31 ENCOUNTER — HOSPITAL ENCOUNTER (OUTPATIENT)
Dept: RADIOLOGY | Facility: CLINIC | Age: 54
Discharge: HOME | End: 2024-07-31
Attending: PHYSICIAN ASSISTANT
Payer: MEDICARE

## 2024-07-31 ENCOUNTER — OFFICE VISIT (OUTPATIENT)
Dept: ORTHOPEDICS | Facility: CLINIC | Age: 54
End: 2024-07-31
Payer: MEDICARE

## 2024-07-31 VITALS — HEIGHT: 70 IN | WEIGHT: 162.06 LBS | BODY MASS INDEX: 23.2 KG/M2

## 2024-07-31 DIAGNOSIS — M25.551 RIGHT HIP PAIN: ICD-10-CM

## 2024-07-31 DIAGNOSIS — Z96.641 HISTORY OF TOTAL HIP REPLACEMENT, RIGHT: ICD-10-CM

## 2024-07-31 DIAGNOSIS — S76.011A STRAIN OF FLEXOR MUSCLE OF RIGHT HIP, INITIAL ENCOUNTER: Primary | ICD-10-CM

## 2024-07-31 PROCEDURE — 99213 OFFICE O/P EST LOW 20 MIN: CPT | Mod: ,,, | Performed by: PHYSICIAN ASSISTANT

## 2024-07-31 PROCEDURE — 73502 X-RAY EXAM HIP UNI 2-3 VIEWS: CPT | Mod: RT,,, | Performed by: PHYSICIAN ASSISTANT

## 2024-07-31 RX ORDER — SERTRALINE HYDROCHLORIDE 100 MG/1
150 TABLET, FILM COATED ORAL
COMMUNITY
Start: 2024-07-22

## 2024-07-31 RX ORDER — MIRTAZAPINE 45 MG/1
45 TABLET, FILM COATED ORAL NIGHTLY
COMMUNITY
Start: 2024-07-22

## 2024-07-31 NOTE — PROGRESS NOTES
Chief Complaint:   Chief Complaint   Patient presents with    Right Hip - Pain     Rt hip pain, wbat, ambulates without assistance, states when he sits feels as if something is pinching him, states also has some numbness, states does sleep on right side a lot, states tries not to take anything for relief        History of present illness:    53-year-old male presents office today for evaluation his right hip pain.  His pain has been present for couple weeks and located in the groin.  He has a history of right total hip arthroplasty done in 2021.  His pain is worse with prolonged sitting.  He denies history of injury but states that a couple weeks ago he ran to his truck to go check on his dad in which he thought was in pain.  He has not run like that in a long time.  He denies any numbness or tingling down the leg    Past Medical History:   Diagnosis Date    Anxiety     Biliary dyskinesia     Chondromalacia left knee     Chronic back pain     Fatigue     GERD (gastroesophageal reflux disease)     History of migraine     History of prostate cancer     HLD (hyperlipidemia)        Past Surgical History:   Procedure Laterality Date    ABLATION OF TISSUE OF CARDIAC SEPTUM USING ALCOHOL FOR HYPERTROPHIC OBSTRUCTIVE CARDIOMYOPATHY      CHOLECYSTECTOMY      COLONOSCOPY W/ POLYPECTOMY  07/30/2013    ESOPHAGOGASTRODUODENOSCOPY  07/05/2017    ESOPHAGOGASTRODUODENOSCOPY  12/09/2016    LAMINECTOMY      L5 S1    LAPAROSCOPIC CHOLECYSTECTOMY N/A 09/29/2022    Procedure: CHOLECYSTECTOMY, LAPAROSCOPIC;  Surgeon: Jesus Delcid Jr., MD;  Location: [facility];  Service: General;  Laterality: N/A;    nica hip replacement Right 12/16/2021    NICA hip replacment  Left 10/26/2021    PROSTATE SURGERY      REPAIR OF MENISCUS OF KNEE         Current Outpatient Medications   Medication Sig    clonazePAM (KLONOPIN) 1 MG tablet TAKE ONE TABLET BY MOUTH THREE TIMES DAILY    hydrOXYzine (ATARAX) 50 MG tablet TAKE ONE TABLET BY MOUTH EVERY  EVENING    mirtazapine (REMERON) 45 MG tablet Take 45 mg by mouth every evening.    omeprazole (PRILOSEC) 40 MG capsule Take 40 mg by mouth once daily.    oxybutynin (DITROPAN) 5 MG Tab Take 5 mg by mouth nightly.    sertraline (ZOLOFT) 100 MG tablet Take 150 mg by mouth.    tiZANidine (ZANAFLEX) 4 MG tablet TAKE ONE TABLET BY MOUTH EVERY EVENING    ALPRAZolam (XANAX) 1 MG tablet TAKE 1.5 tablets by mouth at night to sleep. (Patient not taking: Reported on 7/31/2024)    atorvastatin (LIPITOR) 20 MG tablet Take 1 tablet (20 mg total) by mouth once daily.    HYDROcodone-acetaminophen (NORCO) 7.5-325 mg per tablet Take 1 tablet by mouth every 6 (six) hours as needed for Pain. (Patient not taking: Reported on 7/31/2024)    nitroGLYCERIN (NITROSTAT) 0.4 MG SL tablet Place 0.4 mg under the tongue. (Patient not taking: Reported on 7/31/2024)    tadalafiL (CIALIS) 20 MG Tab Take 20 mg by mouth. (Patient not taking: Reported on 7/31/2024)     No current facility-administered medications for this visit.       Review of patient's allergies indicates:   Allergen Reactions    Adhesive Rash, Dermatitis and Other (See Comments)       Family History   Problem Relation Name Age of Onset    Heart disease Mother Catrachita Bullock     Heart block Father      Diabetes type I Father      No Known Problems Sister         Social History     Socioeconomic History    Marital status:     Number of children: 0   Occupational History    Occupation: self employed   Tobacco Use    Smoking status: Former    Smokeless tobacco: Never   Substance and Sexual Activity    Alcohol use: Not Currently    Drug use: Never    Sexual activity: Yes   Social History Narrative    ** Merged History Encounter **              Review of Systems:    Constitution:   Denies chills, fever, and sweats.  HENT:   Denies headaches or blurry vision.  Cardiovascular:  Denies chest pain or irregular heart beat.  Respiratory:   Denies cough or shortness of  "breath.  Gastrointestinal:  Denies abdominal pain, nausea, or vomiting.  Musculoskeletal:   Denies muscle cramps.  Neurological:   Denies dizziness or focal weakness.  Psychiatric/Behavior: Normal mental status.  Hematology/Lymph:  Denies bleeding problem or easy bruising/bleeding.  Skin:    Denies rash or suspicious lesions.    Examination:    Vital Signs:    Vitals:    07/31/24 1456   Weight: 73.5 kg (162 lb 0.6 oz)   Height: 5' 10" (1.778 m)       Body mass index is 23.25 kg/m².    Constitution:   Well-developed, well nourished patient in no acute distress.  Neurological:   Alert and oriented x 3 and cooperative to examination.     Psychiatric/Behavior: Normal mental status.  Respiratory:   No shortness of breath.  Nonlabored breathing  Cardiovascular:           Regular rate and rhythm  Eyes:    Extraoccular muscles intact  Skin:    No scars, rash or suspicious lesions.    Physical Exam:     right Hip     No swelling, induration   Tender over hip flexors/ sartorious insertion    Well healed scar    No instability of the hip was noted. Motion was normal.     No weakness was observed.    Sensation intact distally    Intact pedal pulses    Complete right hip x-ray with two or more views of the AP and lateral aspects were performed.     Impressions Radiology Test   X-ray of hip was performed showed intact hip prosthesis without signs of loosening or subsidence.        Assessment:     Strain of flexor muscle of right hip, initial encounter  -     X-Ray Hip 2 or 3 views Right with Pelvis when performed; Future; Expected date: 07/31/2024    History of total hip replacement, right        Plan:      I have discussed exam and x-ray findings with the patient today.  He has well-seated well-fixed implant with no signs of loosening or subsidence.  I do feel that he suffered a hip flexor strain when he full out ran couple weeks ago to go check on his dad.  I recommend some stretching exercises and he will follow up with us as " needed        No follow-ups on file.    DISCLAIMER: This note may have been dictated using voice recognition software and may contain grammatical errors.

## 2024-10-14 ENCOUNTER — HOSPITAL ENCOUNTER (EMERGENCY)
Facility: HOSPITAL | Age: 54
Discharge: HOME | End: 2024-10-14
Attending: STUDENT IN AN ORGANIZED HEALTH CARE EDUCATION/TRAINING PROGRAM
Payer: MEDICARE

## 2024-10-14 VITALS
RESPIRATION RATE: 15 BRPM | SYSTOLIC BLOOD PRESSURE: 132 MMHG | BODY MASS INDEX: 26.48 KG/M2 | WEIGHT: 185 LBS | HEIGHT: 70 IN | HEART RATE: 71 BPM | OXYGEN SATURATION: 96 % | DIASTOLIC BLOOD PRESSURE: 76 MMHG | TEMPERATURE: 97 F

## 2024-10-14 DIAGNOSIS — R06.00 DYSPNEA, UNSPECIFIED TYPE: Primary | ICD-10-CM

## 2024-10-14 DIAGNOSIS — R06.02 SHORTNESS OF BREATH: ICD-10-CM

## 2024-10-14 DIAGNOSIS — R29.898 WEAKNESS OF LEFT UPPER EXTREMITY: ICD-10-CM

## 2024-10-14 DIAGNOSIS — R47.9 SPEECH DISTURBANCE, UNSPECIFIED TYPE: ICD-10-CM

## 2024-10-14 LAB
ALBUMIN SERPL-MCNC: 3.9 G/DL (ref 3.5–5)
ALBUMIN/GLOB SERPL: 1.1 RATIO (ref 1.1–2)
ALP SERPL-CCNC: 153 UNIT/L (ref 40–150)
ALT SERPL-CCNC: 150 UNIT/L (ref 0–55)
ANION GAP SERPL CALC-SCNC: 9 MEQ/L
AST SERPL-CCNC: 79 UNIT/L (ref 5–34)
BACTERIA #/AREA URNS AUTO: ABNORMAL /HPF
BASOPHILS # BLD AUTO: 0.05 X10(3)/MCL
BASOPHILS NFR BLD AUTO: 0.8 %
BILIRUB SERPL-MCNC: 0.6 MG/DL
BILIRUB UR QL STRIP.AUTO: NEGATIVE
BNP BLD-MCNC: 17.2 PG/ML
BUN SERPL-MCNC: 14.3 MG/DL (ref 8.4–25.7)
CALCIUM SERPL-MCNC: 9.3 MG/DL (ref 8.4–10.2)
CHLORIDE SERPL-SCNC: 107 MMOL/L (ref 98–107)
CLARITY UR: CLEAR
CO2 SERPL-SCNC: 25 MMOL/L (ref 22–29)
COLOR UR AUTO: ABNORMAL
CREAT SERPL-MCNC: 1.17 MG/DL (ref 0.73–1.18)
CREAT/UREA NIT SERPL: 12
EOSINOPHIL # BLD AUTO: 0.15 X10(3)/MCL (ref 0–0.9)
EOSINOPHIL NFR BLD AUTO: 2.4 %
ERYTHROCYTE [DISTWIDTH] IN BLOOD BY AUTOMATED COUNT: 13.6 % (ref 11.5–17)
FLUAV AG UPPER RESP QL IA.RAPID: NOT DETECTED
FLUBV AG UPPER RESP QL IA.RAPID: NOT DETECTED
GFR SERPLBLD CREATININE-BSD FMLA CKD-EPI: >60 ML/MIN/1.73/M2
GLOBULIN SER-MCNC: 3.4 GM/DL (ref 2.4–3.5)
GLUCOSE SERPL-MCNC: 105 MG/DL (ref 74–100)
GLUCOSE UR QL STRIP: NORMAL
HCT VFR BLD AUTO: 42.9 % (ref 42–52)
HGB BLD-MCNC: 14.2 G/DL (ref 14–18)
HGB UR QL STRIP: NEGATIVE
IMM GRANULOCYTES # BLD AUTO: 0.08 X10(3)/MCL (ref 0–0.04)
IMM GRANULOCYTES NFR BLD AUTO: 1.3 %
KETONES UR QL STRIP: NEGATIVE
LEUKOCYTE ESTERASE UR QL STRIP: NEGATIVE
LYMPHOCYTES # BLD AUTO: 1.25 X10(3)/MCL (ref 0.6–4.6)
LYMPHOCYTES NFR BLD AUTO: 20.2 %
MAGNESIUM SERPL-MCNC: 2.1 MG/DL (ref 1.6–2.6)
MCH RBC QN AUTO: 28.7 PG (ref 27–31)
MCHC RBC AUTO-ENTMCNC: 33.1 G/DL (ref 33–36)
MCV RBC AUTO: 86.8 FL (ref 80–94)
MONOCYTES # BLD AUTO: 0.42 X10(3)/MCL (ref 0.1–1.3)
MONOCYTES NFR BLD AUTO: 6.8 %
MUCOUS THREADS URNS QL MICRO: ABNORMAL /LPF
NEUTROPHILS # BLD AUTO: 4.25 X10(3)/MCL (ref 2.1–9.2)
NEUTROPHILS NFR BLD AUTO: 68.5 %
NITRITE UR QL STRIP: NEGATIVE
NRBC BLD AUTO-RTO: 0 %
OHS QRS DURATION: 86 MS
OHS QTC CALCULATION: 403 MS
PH UR STRIP: 7 [PH]
PLATELET # BLD AUTO: 209 X10(3)/MCL (ref 130–400)
PMV BLD AUTO: 8.8 FL (ref 7.4–10.4)
POTASSIUM SERPL-SCNC: 4.4 MMOL/L (ref 3.5–5.1)
PROT SERPL-MCNC: 7.3 GM/DL (ref 6.4–8.3)
PROT UR QL STRIP: NEGATIVE
RBC # BLD AUTO: 4.94 X10(6)/MCL (ref 4.7–6.1)
RBC #/AREA URNS AUTO: ABNORMAL /HPF
RSV A 5' UTR RNA NPH QL NAA+PROBE: NOT DETECTED
SARS-COV-2 RNA RESP QL NAA+PROBE: NOT DETECTED
SODIUM SERPL-SCNC: 141 MMOL/L (ref 136–145)
SP GR UR STRIP.AUTO: 1.02 (ref 1–1.03)
SQUAMOUS #/AREA URNS LPF: ABNORMAL /HPF
TROPONIN I SERPL-MCNC: <0.01 NG/ML (ref 0–0.04)
TROPONIN I SERPL-MCNC: <0.01 NG/ML (ref 0–0.04)
UROBILINOGEN UR STRIP-ACNC: NORMAL
WBC # BLD AUTO: 6.2 X10(3)/MCL (ref 4.5–11.5)
WBC #/AREA URNS AUTO: ABNORMAL /HPF

## 2024-10-14 PROCEDURE — 80053 COMPREHEN METABOLIC PANEL: CPT

## 2024-10-14 PROCEDURE — 84484 ASSAY OF TROPONIN QUANT: CPT | Performed by: STUDENT IN AN ORGANIZED HEALTH CARE EDUCATION/TRAINING PROGRAM

## 2024-10-14 PROCEDURE — 85025 COMPLETE CBC W/AUTO DIFF WBC: CPT

## 2024-10-14 PROCEDURE — 99285 EMERGENCY DEPT VISIT HI MDM: CPT | Mod: 25

## 2024-10-14 PROCEDURE — 83735 ASSAY OF MAGNESIUM: CPT

## 2024-10-14 PROCEDURE — 93005 ELECTROCARDIOGRAM TRACING: CPT

## 2024-10-14 PROCEDURE — 84484 ASSAY OF TROPONIN QUANT: CPT

## 2024-10-14 PROCEDURE — 81001 URINALYSIS AUTO W/SCOPE: CPT

## 2024-10-14 PROCEDURE — 93010 ELECTROCARDIOGRAM REPORT: CPT | Mod: ,,, | Performed by: STUDENT IN AN ORGANIZED HEALTH CARE EDUCATION/TRAINING PROGRAM

## 2024-10-14 PROCEDURE — 83880 ASSAY OF NATRIURETIC PEPTIDE: CPT

## 2024-10-14 PROCEDURE — 0241U COVID/RSV/FLU A&B PCR: CPT | Performed by: STUDENT IN AN ORGANIZED HEALTH CARE EDUCATION/TRAINING PROGRAM

## 2024-10-14 NOTE — DISCHARGE INSTRUCTIONS
Thanks for letting us take care of you today!  It is our goal to give you courteous care and to keep you comfortable and informed, if you have any questions before you leave I will be happy to try and answer them.    Here is some advice after your visit:    Your visit in the emergency department is NOT definitive care - please follow-up with your primary care doctor and/or specialist within 1-2 days. Please return to the emergency department if you develop worsening symptoms including: fever, chills, chest pain, shortness of breath, weakness, numbness, tingling, nausea, vomiting, inability to eat, drink, or take your medication. Please return if you have any worsening in your condition or if you have any other concerns.    If you had radiology exams like an XRAY or CT in the emergency Department the interpreation on them may be preliminary - there may be less time sensitive findings on the reports please obtain these reports within 24 hours from the hospital or by using your out on your mobile phone to access records.  Bring these to your primary care doctor and/or specialist for further review of incidental findings.    Please review any LAB WORK from your visit today with your primary care physician.    Please continue to take all medications as prescribed.      Please be sure to follow up with your primary care physician.  Concerned that you possibly had a TIA/mini stroke today.

## 2024-10-14 NOTE — FIRST PROVIDER EVALUATION
"Medical screening examination initiated.  I have conducted a focused provider triage encounter, findings are as follows:    Brief history of present illness:  54 year old male sent from [facility] with c/o SOB and chest pain since yesterday    Vitals:    10/14/24 1216   BP: 125/75   BP Location: Left arm   Pulse: 64   Resp: (!) 22   Temp: 97.3 °F (36.3 °C)   TempSrc: Oral   SpO2: 97%   Weight: 83.9 kg (185 lb)   Height: 5' 10" (1.778 m)       Pertinent physical exam:  awake and alert, nad    Brief workup plan:  labs, EKG, cxr    Preliminary workup initiated; this workup will be continued and followed by the physician or advanced practice provider that is assigned to the patient when roomed.  "

## 2024-10-14 NOTE — ED PROVIDER NOTES
Encounter Date: 10/14/2024    SCRIBE #1 NOTE: I, Radha Stokes, am scribing for, and in the presence of,  Vaibhav Ni MD. I have scribed the following portions of the note - the EKG reading. Other sections scribed: HPI, ROS, PE.       History     Chief Complaint   Patient presents with    Shortness of Breath     Pt co SOB since yesterday with minimal CP,  -smoker.     Chest Pain     Hx Afib, ablation      The patient is a 54 year old male with hx of biliary dyskinesia, A Fib., heart ablation, GERD, HLD,  and prostate cancer presenting to the ED due to SOB onset 2 days. The patient complains of generalized weakness but reports that his symptoms has resolved. The patients father reports of the patient having an episode of generalized weakness, SOB, tingling in the left upper extremity, and slurred speech while the patient was en route to the ED. The patient states that the symptoms resolved prior to arriving at the ED.     The history is provided by the patient and medical records. No  was used.     Review of patient's allergies indicates:   Allergen Reactions    Adhesive Rash, Dermatitis and Other (See Comments)     Past Medical History:   Diagnosis Date    Anxiety     Biliary dyskinesia     Chondromalacia left knee     Chronic back pain     Fatigue     GERD (gastroesophageal reflux disease)     History of migraine     History of prostate cancer     HLD (hyperlipidemia)      Past Surgical History:   Procedure Laterality Date    ABLATION OF TISSUE OF CARDIAC SEPTUM USING ALCOHOL FOR HYPERTROPHIC OBSTRUCTIVE CARDIOMYOPATHY      CHOLECYSTECTOMY      COLONOSCOPY W/ POLYPECTOMY  07/30/2013    ESOPHAGOGASTRODUODENOSCOPY  07/05/2017    ESOPHAGOGASTRODUODENOSCOPY  12/09/2016    LAMINECTOMY      L5 S1    LAPAROSCOPIC CHOLECYSTECTOMY N/A 09/29/2022    Procedure: CHOLECYSTECTOMY, LAPAROSCOPIC;  Surgeon: Jesus Delcid Jr., MD;  Location: [facility];  Service: General;  Laterality: N/A;    [facility]  replacement Right 12/16/2021    BIENVENIDO hip replacment  Left 10/26/2021    PROSTATE SURGERY      REPAIR OF MENISCUS OF KNEE       Family History   Problem Relation Name Age of Onset    Heart disease Mother Catrachita Bullock     Heart block Father      Diabetes type I Father      No Known Problems Sister       Social History     Tobacco Use    Smoking status: Former    Smokeless tobacco: Never   Substance Use Topics    Alcohol use: Not Currently    Drug use: Never     Review of Systems   Respiratory:  Positive for shortness of breath.    Neurological:  Positive for weakness (Generalized weakness.).       Physical Exam     Initial Vitals [10/14/24 1216]   BP Pulse Resp Temp SpO2   125/75 64 (!) 22 97.3 °F (36.3 °C) 97 %      MAP       --         Physical Exam    Constitutional: He appears well-developed and well-nourished. He is not diaphoretic. No distress.   HENT:   Head: Normocephalic and atraumatic.   Right Ear: External ear normal.   Left Ear: External ear normal.   Nose: Nose normal.   Eyes: EOM are normal. Pupils are equal, round, and reactive to light. Right eye exhibits no discharge. Left eye exhibits no discharge.   Cardiovascular:  Normal rate, regular rhythm and normal heart sounds.     Exam reveals no gallop and no friction rub.       No murmur heard.  Pulmonary/Chest: Effort normal and breath sounds normal. No respiratory distress. He has no wheezes. He has no rhonchi. He has no rales. He exhibits no tenderness.   Abdominal: Abdomen is soft. Bowel sounds are normal. He exhibits no distension and no mass. There is no abdominal tenderness. There is no rebound and no guarding.   Musculoskeletal:         General: No edema. Normal range of motion.     Neurological: He is alert and oriented to person, place, and time. No cranial nerve deficit or sensory deficit.   No focal neurological deficits.   Skin: Skin is warm and dry. Capillary refill takes less than 2 seconds.         ED Course   Procedures  Labs Reviewed    COMPREHENSIVE METABOLIC PANEL - Abnormal       Result Value    Sodium 141      Potassium 4.4      Chloride 107      CO2 25      Glucose 105 (*)     Blood Urea Nitrogen 14.3      Creatinine 1.17      Calcium 9.3      Protein Total 7.3      Albumin 3.9      Globulin 3.4      Albumin/Globulin Ratio 1.1      Bilirubin Total 0.6       (*)      (*)     AST 79 (*)     eGFR >60      Anion Gap 9.0      BUN/Creatinine Ratio 12     URINALYSIS, REFLEX TO URINE CULTURE - Abnormal    Color, UA Light-Yellow      Appearance, UA Clear      Specific Gravity, UA 1.017      pH, UA 7.0      Protein, UA Negative      Glucose, UA Normal      Ketones, UA Negative      Blood, UA Negative      Bilirubin, UA Negative      Urobilinogen, UA Normal      Nitrites, UA Negative      Leukocyte Esterase, UA Negative      RBC, UA None Seen      WBC, UA None Seen      Bacteria, UA None Seen      Squamous Epithelial Cells, UA None Seen      Mucous, UA Trace (*)    CBC WITH DIFFERENTIAL - Abnormal    WBC 6.20      RBC 4.94      Hgb 14.2      Hct 42.9      MCV 86.8      MCH 28.7      MCHC 33.1      RDW 13.6      Platelet 209      MPV 8.8      Neut % 68.5      Lymph % 20.2      Mono % 6.8      Eos % 2.4      Basophil % 0.8      Lymph # 1.25      Neut # 4.25      Mono # 0.42      Eos # 0.15      Baso # 0.05      IG# 0.08 (*)     IG% 1.3      NRBC% 0.0     B-TYPE NATRIURETIC PEPTIDE - Normal    Natriuretic Peptide 17.2     MAGNESIUM - Normal    Magnesium Level 2.10     TROPONIN I - Normal    Troponin-I <0.010     COVID/RSV/FLU A&B PCR - Normal    Influenza A PCR Not Detected      Influenza B PCR Not Detected      Respiratory Syncytial Virus PCR Not Detected      SARS-CoV-2 PCR Not Detected      Narrative:     The Xpert Xpress SARS-CoV-2/FLU/RSV plus is a rapid, multiplexed real-time PCR test intended for the simultaneous qualitative detection and differentiation of SARS-CoV-2, Influenza A, Influenza B, and respiratory syncytial virus (RSV)  viral RNA in either nasopharyngeal swab or nasal swab specimens.         TROPONIN I - Normal    Troponin-I <0.010     CBC W/ AUTO DIFFERENTIAL    Narrative:     The following orders were created for panel order CBC auto differential.  Procedure                               Abnormality         Status                     ---------                               -----------         ------                     CBC with Differential[[id number]]       Abnormal            Final result                 Please view results for these tests on the individual orders.     EKG Readings: (Independently Interpreted)   Initial Reading: No STEMI. Heart Rate: 67. Ectopy: No Ectopy. Conduction: Normal. ST Segments: Normal ST Segments. T Waves: Normal. Axis: Right Axis Deviation.   1216. Sinus rhythm. QTC 4 3. No ST elevation or depression.     ECG Results              EKG 12-lead (Final result)        Collection Time Result Time QRS Duration OHS QTC Calculation    10/14/24 12:16:03 10/14/24 13:34:06 86 403                     Final result by Interface, Lab In [facility] (10/14/24 13:34:14)                   Narrative:    Test Reason : R06.02,    Vent. Rate : 067 BPM     Atrial Rate : 067 BPM     P-R Int : 152 ms          QRS Dur : 086 ms      QT Int : 382 ms       P-R-T Axes : 065 098 039 degrees     QTc Int : 403 ms    Normal sinus rhythm  Rightward axis  Borderline Abnormal ECG  When compared with ECG of 26-SEP-2022 11:30,  Minimal criteria for Septal infarct are no longer Present  Confirmed by David Chavez MD ([id number]) on 10/14/2024 1:34:03 PM    Referred By:             Confirmed By:David Chavez MD                                  Imaging Results              CT Head Without Contrast (Final result)  Result time 10/14/24 17:14:03      Final result by Kamaljit Vila MD (10/14/24 17:14:03)                   Impression:      No acute abnormality seen      Electronically signed by: Woodrow Vila  Date:    10/14/2024  Time:    17:14                Narrative:    EXAMINATION:  CT HEAD WITHOUT CONTRAST    CLINICAL HISTORY:  Neuro deficit, acute, stroke suspected;    TECHNIQUE:  Multiple axial images were obtained from the base of the brain to the vertex without contrast administration.  Sagittal and coronal reconstructions were performed. .Automatic exposure control  (AEC) is utilized to reduce patient radiation exposure.    COMPARISON:  None    FINDINGS:  There is no intracranial mass or lesion seen.  No hemorrhage is seen.  No infarct is seen.  The ventricles and basilar cisterns appear normal.  Brain parenchyma appears grossly unremarkable.    Posterior fossa appears normal.  The calvarium is intact.  The paranasal sinuses appear grossly unremarkable.                                       X-Ray Chest PA And Lateral (Final result)  Result time 10/14/24 12:44:55      Final result by Ranulfo García MD (10/14/24 12:44:55)                   Impression:      Ill-defined opacity at the left base might be represent confluent pleuroparenchymal in osseous markings other entities cannot be completely excluded oblique projections might prove helpful for further assessment.    Otherwise no active pulmonary disease      Electronically signed by: Ranulfo García  Date:    10/14/2024  Time:    12:44               Narrative:    EXAMINATION:  XR CHEST PA AND LATERAL    CLINICAL HISTORY:  , Shortness of breath.    FINDINGS:  No alveolar consolidation, effusion, or pneumothorax is seen.   The thoracic aorta is normal  cardiac silhouette, central pulmonary vessels and mediastinum are normal in size and are grossly unremarkable.   visualized osseous structures are grossly unremarkable..    There is an ill-defined opacity at the left base which might be related to confluent pleuroparenchymal in osseous markings, however, other entities cannot be completely excluded oblique projections might prove helpful for further assessment                                        Medications - No data to display  Medical Decision Making  Differential diagnosis includes but is not limited to, COPD, asthma, pneumonia, viral syndrome, PE, pneumothorax, congestive heart failure, CAD, MI, pericarditis, anemia, electrolyte abnormality, hypotension, pleural effusion    See ED course for MDM    Amount and/or Complexity of Data Reviewed  External Data Reviewed: notes.     Details: See ED course  Labs: ordered. Decision-making details documented in ED Course.  Radiology: ordered and independent interpretation performed. Decision-making details documented in ED Course.  ECG/medicine tests: ordered and independent interpretation performed. Decision-making details documented in ED Course.    Risk  OTC drugs.  Prescription drug management.            Scribe Attestation:   Scribe #1: I performed the above scribed service and the documentation accurately describes the services I performed. I attest to the accuracy of the note.    Attending Attestation:           Physician Attestation for Scribe:  Physician Attestation Statement for Scribe #1: I, Vaibhav Ni MD, reviewed documentation, as scribed by Radha Stokes in my presence, and it is both accurate and complete.             ED Course as of 10/15/24 0007   Mon Oct 14, 2024   1614 EKG done at 12:16 p.m. shows sinus rhythm rate of 67 QTC 4 3.  Right axis deviation.  No ST elevation or depression. [MM]   1620 Chart review his history of insomnia, anxiety [MM]   1620 Troponin I: <0.010 [MM]   1620 Magnesium : 2.10 [MM]   1620 BNP: 17.2 [MM]   1732 CT Head Without Contrast  Negative for acute [MM]   1803 Troponin I: <0.010 [MM]   1805 Patient was awake alert well-appearing.  No focal neuro deficits, no complaints at this time.  He reports he was some shortness of breath last night earlier today but this has now resolved.  Father reports that he had some slurred speech on the car ride over to the emergency department today.  Patient reports at that  time he did feel some weakness numbness to his left arm but once again this is resolved.  His workup here is negative.  Troponin x2 negative, undetectable.  EKG reassuring.  CT head and chest x-ray unrevealing.  He was vitals are stable.  He was no focal neuro deficits at this time.  Patient he was now requesting to leave.  Reports he was to get home and do some work and/or  some medications.  He was awake alert GCS 15 has capacity. His ABCD2 score is 2 points, low risk.  He was strongly encouraged to follow up with the PCP, concerned he possibly had a TIA.  Patient voiced understanding as did father in room. Return precautions given.  Questions invited, questions answered to the best my ability.  Patient discharged home condition stable.   [MM]   1810 ABCD<sup>2    RESULT SUMMARY:  2 points  Per the validation study, 0-3 points: Low Risk  2-Day Stroke Risk: 1.0%  7-Day Stroke Risk: 1.2%  90-Day Stroke Risk: 3.1%      INPUTS:  Age >= 60 years -> 0 = No  BP >= 140/90 mmHg -> 0 = No  Clinical features of the TIA -> 1 = Speech disturbance without weakness  Duration of symptoms -> 1 = 10-59 minutes  History of diabetes -> 0 = No   [MM]      ED Course User Index  [MM] Vaibhav Ni MD                           Clinical Impression:  Final diagnoses:  [R06.02] Shortness of breath  [R06.00] Dyspnea, unspecified type (Primary)  [R47.9] Speech disturbance, unspecified type - Transient/resolved  [R29.898] Weakness of left upper extremity - Transient/resolved          ED Disposition Condition    Discharge Stable          ED Prescriptions    None       Follow-up Information       Follow up With Specialties Details Why Contact Info    Mike Roebrts MD Family Medicine Call   [address]  [place] [address]  [phone]               Vaibhav Ni MD  10/15/24 0003

## 2025-07-30 ENCOUNTER — HOSPITAL ENCOUNTER (OUTPATIENT)
Dept: RADIOLOGY | Facility: CLINIC | Age: 55
Discharge: HOME | End: 2025-07-30
Attending: PHYSICIAN ASSISTANT
Payer: MEDICARE

## 2025-07-30 ENCOUNTER — OFFICE VISIT (OUTPATIENT)
Dept: ORTHOPEDICS | Facility: CLINIC | Age: 55
End: 2025-07-30
Payer: MEDICARE

## 2025-07-30 VITALS
HEIGHT: 70 IN | SYSTOLIC BLOOD PRESSURE: 108 MMHG | DIASTOLIC BLOOD PRESSURE: 68 MMHG | WEIGHT: 184.94 LBS | BODY MASS INDEX: 26.48 KG/M2 | HEART RATE: 64 BPM

## 2025-07-30 DIAGNOSIS — M54.12 CERVICAL RADICULOPATHY: Primary | ICD-10-CM

## 2025-07-30 DIAGNOSIS — M79.601 RIGHT ARM PAIN: ICD-10-CM

## 2025-07-30 DIAGNOSIS — M50.90 CERVICAL DISC DISEASE: ICD-10-CM

## 2025-07-30 DIAGNOSIS — M79.672 LEFT FOOT PAIN: ICD-10-CM

## 2025-07-30 DIAGNOSIS — M79.641 RIGHT HAND PAIN: ICD-10-CM

## 2025-07-30 PROCEDURE — 99214 OFFICE O/P EST MOD 30 MIN: CPT | Mod: ,,, | Performed by: PHYSICIAN ASSISTANT

## 2025-07-30 PROCEDURE — 3074F SYST BP LT 130 MM HG: CPT | Mod: CPTII,,, | Performed by: PHYSICIAN ASSISTANT

## 2025-07-30 PROCEDURE — 3078F DIAST BP <80 MM HG: CPT | Mod: CPTII,,, | Performed by: PHYSICIAN ASSISTANT

## 2025-07-30 PROCEDURE — 73630 X-RAY EXAM OF FOOT: CPT | Mod: LT,,, | Performed by: PHYSICIAN ASSISTANT

## 2025-07-30 PROCEDURE — 1160F RVW MEDS BY RX/DR IN RCRD: CPT | Mod: CPTII,,, | Performed by: PHYSICIAN ASSISTANT

## 2025-07-30 PROCEDURE — 3008F BODY MASS INDEX DOCD: CPT | Mod: CPTII,,, | Performed by: PHYSICIAN ASSISTANT

## 2025-07-30 PROCEDURE — 1159F MED LIST DOCD IN RCRD: CPT | Mod: CPTII,,, | Performed by: PHYSICIAN ASSISTANT

## 2025-07-30 PROCEDURE — 73130 X-RAY EXAM OF HAND: CPT | Mod: RT,,, | Performed by: PHYSICIAN ASSISTANT

## 2025-07-30 RX ORDER — ROPINIROLE 1 MG/1
1 TABLET, FILM COATED ORAL NIGHTLY
COMMUNITY
Start: 2025-07-21

## 2025-07-30 RX ORDER — PREDNISONE 20 MG/1
20 TABLET ORAL DAILY
Qty: 10 TABLET | Refills: 0 | Status: SHIPPED | OUTPATIENT
Start: 2025-07-30 | End: 2025-08-09

## 2025-07-30 RX ORDER — ARIPIPRAZOLE 5 MG/1
5 TABLET ORAL DAILY
COMMUNITY

## 2025-07-30 RX ORDER — LURASIDONE HYDROCHLORIDE 20 MG/1
20 TABLET, FILM COATED ORAL
COMMUNITY
Start: 2025-07-21

## 2025-07-30 RX ORDER — GABAPENTIN 300 MG/1
300 CAPSULE ORAL NIGHTLY
Qty: 14 CAPSULE | Refills: 0 | Status: SHIPPED | OUTPATIENT
Start: 2025-07-30 | End: 2025-08-13

## 2025-07-30 NOTE — PROGRESS NOTES
Chief Complaint:   Chief Complaint   Patient presents with    Pain     R forearm and wrist - Started about 1 mo ago randomly. states when he stretches his arm out, he has wrist pain. Describes it is a constant numbness and stinging when he stretches his fingers out. Limited ROM in wrist. Not taking anything for the pain. No swelling        History of present illness:      History of Present Illness    CHIEF COMPLAINT:  - Right wrist and forearm numbness and discomfort.    HPI:  Mr. Bullock presents with right wrist and forearm discomfort that has persisted for approximately 1-2 months. He describes the sensation as unusual and somewhat numb, stating that he can feel it, but it is almost as if he cannot. The discomfort worsens with overstretching, particularly when picking up objects from the ground while standing, and is more noticeable when manipulating his shoulder or stretching out his arm.    He reports a history of neck problems but denies any neck surgery or specific injury to the wrist or forearm area. He also denies significant nighttime symptoms. He denies numbness into the fingers.          Past Medical History:   Diagnosis Date    Anxiety     Biliary dyskinesia     Chondromalacia left knee     Chronic back pain     Fatigue     GERD (gastroesophageal reflux disease)     History of migraine     History of prostate cancer     HLD (hyperlipidemia)        Past Surgical History:   Procedure Laterality Date    ABLATION OF TISSUE OF CARDIAC SEPTUM USING ALCOHOL FOR HYPERTROPHIC OBSTRUCTIVE CARDIOMYOPATHY      CHOLECYSTECTOMY      COLONOSCOPY W/ POLYPECTOMY  07/30/2013    ESOPHAGOGASTRODUODENOSCOPY  07/05/2017    ESOPHAGOGASTRODUODENOSCOPY  12/09/2016    LAMINECTOMY      L5 S1    LAPAROSCOPIC CHOLECYSTECTOMY N/A 09/29/2022    Procedure: CHOLECYSTECTOMY, LAPAROSCOPIC;  Surgeon: Jesus Delcid Jr., MD;  Location: [facility];  Service: General;  Laterality: N/A;    [facility] hip replacement Right 12/16/2021    [facility] hip  replacment  Left 10/26/2021    PROSTATE SURGERY      REPAIR OF MENISCUS OF KNEE         Current Outpatient Medications   Medication Sig    ARIPiprazole (ABILIFY) 5 MG Tab Take 5 mg by mouth once daily.    clonazePAM (KLONOPIN) 1 MG tablet TAKE ONE TABLET BY MOUTH THREE TIMES DAILY    hydrOXYzine (ATARAX) 50 MG tablet TAKE ONE TABLET BY MOUTH EVERY EVENING    LATUDA 20 mg Tab tablet Take 20 mg by mouth.    mirtazapine (REMERON) 45 MG tablet Take 45 mg by mouth every evening.    omeprazole (PRILOSEC) 40 MG capsule Take 40 mg by mouth once daily.    oxybutynin (DITROPAN) 5 MG Tab Take 5 mg by mouth nightly.    rOPINIRole (REQUIP) 1 MG tablet Take 1 mg by mouth every evening.    sertraline (ZOLOFT) 100 MG tablet Take 150 mg by mouth.    tiZANidine (ZANAFLEX) 4 MG tablet TAKE ONE TABLET BY MOUTH EVERY EVENING    ALPRAZolam (XANAX) 1 MG tablet TAKE 1.5 tablets by mouth at night to sleep. (Patient not taking: Reported on 7/30/2025)    atorvastatin (LIPITOR) 20 MG tablet Take 1 tablet (20 mg total) by mouth once daily.    gabapentin (NEURONTIN) 300 MG capsule Take 1 capsule (300 mg total) by mouth every evening. for 14 days    HYDROcodone-acetaminophen (NORCO) 7.5-325 mg per tablet Take 1 tablet by mouth every 6 (six) hours as needed for Pain. (Patient not taking: Reported on 7/30/2025)    nitroGLYCERIN (NITROSTAT) 0.4 MG SL tablet Place 0.4 mg under the tongue. (Patient not taking: Reported on 7/30/2025)    predniSONE (DELTASONE) 20 MG tablet Take 1 tablet (20 mg total) by mouth once daily. for 10 days    tadalafiL (CIALIS) 20 MG Tab Take 20 mg by mouth. (Patient not taking: Reported on 7/30/2025)     No current facility-administered medications for this visit.       Review of patient's allergies indicates:   Allergen Reactions    Adhesive Rash, Dermatitis and Other (See Comments)       Family History   Problem Relation Name Age of Onset    Heart disease Mother Catrachita Bullock     Heart block Father      Diabetes type I  "Father      No Known Problems Sister         Social History[1]      Review of Systems:    Constitution:   Denies chills, fever, and sweats.  HENT:   Denies headaches or blurry vision.  Cardiovascular:  Denies chest pain or irregular heart beat.  Respiratory:   Denies cough or shortness of breath.  Gastrointestinal:  Denies abdominal pain, nausea, or vomiting.  Musculoskeletal:   Denies muscle cramps.  Neurological:   Denies dizziness or focal weakness.  Psychiatric/Behavior: Normal mental status.  Hematology/Lymph:  Denies bleeding problem or easy bruising/bleeding.  Skin:    Denies rash or suspicious lesions.    Examination:    Vital Signs:    Vitals:    07/30/25 1007   BP: 108/68   Pulse: 64   Weight: 83.9 kg (184 lb 15.5 oz)   Height: 5' 10" (1.778 m)       Body mass index is 26.54 kg/m².    Constitution:   Well-developed, well nourished patient in no acute distress.  Neurological:   Alert and oriented x 3 and cooperative to examination.     Psychiatric/Behavior: Normal mental status.  Respiratory:   No shortness of breath.  Nonlabored breathing  Cardiovascular:           Regular rate and rhythm  Eyes:    Extraoccular muscles intact  Skin:    No scars, rash or suspicious lesions.    Physical Exam:     Right upper extremity exam   No atrophy, no swelling   He has some tenderness over the paraspinous final musculature of the cervical spine   Intact range of motion to the cervical spine in flexion, extension, lateral bend and rotation   Manual motor testing of the upper extremities is 5/5 bilaterally DTRs are intact and symmetrical   He does have some discomfort with carpal compression   Sensation intact distally   Negative Tinel sign   Radial pulses 2+    Right wrist radiographs taken office today, three views show no osteoarticular abnormalities   Cervical radiographs reviewed from 2023 showing some disc disease seen at C5-6 and C6-7    Left foot radiographs taken office today, three views show no osteoarticular " abnormalities        Assessment:     Cervical radiculopathy  -     Cancel: X-Ray Forearm Right; Future; Expected date: 07/30/2025  -     Ambulatory Referral/Consult to Physical Therapy; Future; Expected date: 08/06/2025    Cervical disc disease  -     X-Ray Hand 3 view Right; Future; Expected date: 07/30/2025  -     Ambulatory Referral/Consult to Physical Therapy; Future; Expected date: 08/06/2025    Other orders  -     predniSONE (DELTASONE) 20 MG tablet; Take 1 tablet (20 mg total) by mouth once daily. for 10 days  Dispense: 10 tablet; Refill: 0  -     gabapentin (NEURONTIN) 300 MG capsule; Take 1 capsule (300 mg total) by mouth every evening. for 14 days  Dispense: 14 capsule; Refill: 0        Plan:      Assessment & Plan    I have discussed exam and x-ray findings with the patient today.  I do feel that he has some cervical radiculopathy with a history of cervical disc disease.  We have discussed possible carpal tunnel syndrome the right wrist.  He has tingling and discomfort in the right wrist after manipulation of the right arm and different positions.  I will put him on done 20 mg daily for 10 days and also prescribe some gabapentin 300 mg at night.  We will start some physical therapy and he will follow up in 4-6 weeks.  If no significant improvement we have discussed MRI of the cervical spine and possible EMG of the right upper extremity    REFERRALS:  - Referred to PT for neck traction.    PROCEDURES:  - Discussed potential future MRI of the C spine and EMG if symptoms do not improve with initial treatment.    FOLLOW UP:  - Follow up if no improvement with conservative treatments.    PATIENT INSTRUCTIONS:  - Use carpal tunnel brace (wrist splint).              Follow up in about 6 weeks (around 9/10/2025).    This note was generated with the assistance of ambient listening technology. Verbal consent was obtained by the patient and accompanying visitor(s) for the recording of patient appointment to  facilitate this note. I attest to having reviewed and edited the generated note for accuracy, though some syntax or spelling errors may persist. Please contact the author of this note for any clarification.       DISCLAIMER: This note may have been dictated using voice recognition software and may contain grammatical errors.          [1]   Social History  Socioeconomic History    Marital status:     Number of children: 0   Occupational History    Occupation: self employed   Tobacco Use    Smoking status: Former    Smokeless tobacco: Never   Substance and Sexual Activity    Alcohol use: Not Currently    Drug use: Never    Sexual activity: Yes   Social History Narrative    ** Merged History Encounter **

## 2025-08-25 RX ORDER — GABAPENTIN 300 MG/1
CAPSULE ORAL
Qty: 14 CAPSULE | Refills: 0 | Status: SHIPPED | OUTPATIENT
Start: 2025-08-25